# Patient Record
Sex: MALE | Race: WHITE | NOT HISPANIC OR LATINO | Employment: FULL TIME | URBAN - METROPOLITAN AREA
[De-identification: names, ages, dates, MRNs, and addresses within clinical notes are randomized per-mention and may not be internally consistent; named-entity substitution may affect disease eponyms.]

---

## 2017-06-20 ENCOUNTER — HOSPITAL ENCOUNTER (INPATIENT)
Facility: HOSPITAL | Age: 27
LOS: 8 days | Discharge: HOME | DRG: 885 | End: 2017-06-28
Attending: PSYCHIATRY & NEUROLOGY | Admitting: PSYCHIATRY & NEUROLOGY

## 2017-06-20 ENCOUNTER — HOSPITAL ENCOUNTER (EMERGENCY)
Facility: HOSPITAL | Age: 27
Discharge: PSYCHIATRIC OR SUBSTANCE USE FACILITY | End: 2017-06-20
Attending: SURGERY

## 2017-06-20 VITALS
RESPIRATION RATE: 20 BRPM | WEIGHT: 240 LBS | TEMPERATURE: 98 F | SYSTOLIC BLOOD PRESSURE: 107 MMHG | DIASTOLIC BLOOD PRESSURE: 54 MMHG | HEART RATE: 60 BPM

## 2017-06-20 DIAGNOSIS — F32.A DEPRESSION WITH SUICIDAL IDEATION: Primary | ICD-10-CM

## 2017-06-20 DIAGNOSIS — F43.10 PTSD (POST-TRAUMATIC STRESS DISORDER): ICD-10-CM

## 2017-06-20 DIAGNOSIS — F31.4 BIPOLAR DISORDER, CURRENT EPISODE DEPRESSED, SEVERE, WITHOUT PSYCHOTIC FEATURES: ICD-10-CM

## 2017-06-20 DIAGNOSIS — R45.851 DEPRESSION WITH SUICIDAL IDEATION: Primary | ICD-10-CM

## 2017-06-20 DIAGNOSIS — F41.1 GAD (GENERALIZED ANXIETY DISORDER): ICD-10-CM

## 2017-06-20 DIAGNOSIS — F17.210 CIGARETTE NICOTINE DEPENDENCE WITHOUT COMPLICATION: ICD-10-CM

## 2017-06-20 PROBLEM — F31.30 BIPOLAR AFFECTIVE DISORDER, CURRENT EPISODE DEPRESSED: Status: ACTIVE | Noted: 2017-06-20

## 2017-06-20 LAB
ALBUMIN SERPL BCP-MCNC: 4 G/DL
ALP SERPL-CCNC: 74 U/L
ALT SERPL W/O P-5'-P-CCNC: 12 U/L
AMPHET+METHAMPHET UR QL: NEGATIVE
ANION GAP SERPL CALC-SCNC: 9 MMOL/L
APAP SERPL-MCNC: <3 UG/ML
AST SERPL-CCNC: 14 U/L
BACTERIA #/AREA URNS HPF: ABNORMAL /HPF
BARBITURATES UR QL SCN>200 NG/ML: NEGATIVE
BASOPHILS # BLD AUTO: 0.01 K/UL
BASOPHILS NFR BLD: 0.1 %
BENZODIAZ UR QL SCN>200 NG/ML: NEGATIVE
BILIRUB SERPL-MCNC: 0.4 MG/DL
BILIRUB UR QL STRIP: ABNORMAL
BUN SERPL-MCNC: 10 MG/DL
BZE UR QL SCN: NEGATIVE
CALCIUM SERPL-MCNC: 8.9 MG/DL
CANNABINOIDS UR QL SCN: NEGATIVE
CHLORIDE SERPL-SCNC: 107 MMOL/L
CLARITY UR: CLEAR
CO2 SERPL-SCNC: 26 MMOL/L
COLOR UR: YELLOW
CREAT SERPL-MCNC: 1 MG/DL
CREAT UR-MCNC: 348.2 MG/DL
DIFFERENTIAL METHOD: ABNORMAL
EOSINOPHIL # BLD AUTO: 0.1 K/UL
EOSINOPHIL NFR BLD: 1.3 %
ERYTHROCYTE [DISTWIDTH] IN BLOOD BY AUTOMATED COUNT: 12.3 %
EST. GFR  (AFRICAN AMERICAN): >60 ML/MIN/1.73 M^2
EST. GFR  (NON AFRICAN AMERICAN): >60 ML/MIN/1.73 M^2
ETHANOL SERPL-MCNC: <10 MG/DL
GLUCOSE SERPL-MCNC: 98 MG/DL
GLUCOSE UR QL STRIP: NEGATIVE
HCT VFR BLD AUTO: 41.1 %
HGB BLD-MCNC: 13.8 G/DL
HGB UR QL STRIP: NEGATIVE
HYALINE CASTS #/AREA URNS LPF: 0 /LPF
KETONES UR QL STRIP: NEGATIVE
LEUKOCYTE ESTERASE UR QL STRIP: NEGATIVE
LYMPHOCYTES # BLD AUTO: 2.2 K/UL
LYMPHOCYTES NFR BLD: 31.3 %
MCH RBC QN AUTO: 31.8 PG
MCHC RBC AUTO-ENTMCNC: 33.6 %
MCV RBC AUTO: 95 FL
METHADONE UR QL SCN>300 NG/ML: NEGATIVE
MICROSCOPIC COMMENT: ABNORMAL
MONOCYTES # BLD AUTO: 0.7 K/UL
MONOCYTES NFR BLD: 10.2 %
NEUTROPHILS # BLD AUTO: 3.9 K/UL
NEUTROPHILS NFR BLD: 57.1 %
NITRITE UR QL STRIP: NEGATIVE
OPIATES UR QL SCN: NEGATIVE
PCP UR QL SCN>25 NG/ML: NEGATIVE
PH UR STRIP: 6 [PH] (ref 5–8)
PLATELET # BLD AUTO: 130 K/UL
PMV BLD AUTO: 10 FL
POTASSIUM SERPL-SCNC: 3.1 MMOL/L
PROT SERPL-MCNC: 7.1 G/DL
PROT UR QL STRIP: ABNORMAL
RBC # BLD AUTO: 4.34 M/UL
RBC #/AREA URNS HPF: 3 /HPF (ref 0–4)
SODIUM SERPL-SCNC: 142 MMOL/L
SP GR UR STRIP: >=1.03 (ref 1–1.03)
T4 FREE SERPL-MCNC: 0.95 NG/DL
TOXICOLOGY INFORMATION: NORMAL
TSH SERPL DL<=0.005 MIU/L-ACNC: 4.98 UIU/ML
URN SPEC COLLECT METH UR: ABNORMAL
UROBILINOGEN UR STRIP-ACNC: 1 EU/DL
WBC # BLD AUTO: 6.86 K/UL
WBC #/AREA URNS HPF: 2 /HPF (ref 0–5)

## 2017-06-20 PROCEDURE — 80053 COMPREHEN METABOLIC PANEL: CPT

## 2017-06-20 PROCEDURE — 99223 1ST HOSP IP/OBS HIGH 75: CPT | Mod: ,,, | Performed by: PSYCHIATRY & NEUROLOGY

## 2017-06-20 PROCEDURE — 11400000 HC PSYCH PRIVATE ROOM

## 2017-06-20 PROCEDURE — 27000339 *HC DAILY SUPPLY KIT

## 2017-06-20 PROCEDURE — 99252 IP/OBS CONSLTJ NEW/EST SF 35: CPT | Mod: ,,, | Performed by: NURSE PRACTITIONER

## 2017-06-20 PROCEDURE — 25000003 PHARM REV CODE 250: Performed by: PSYCHIATRY & NEUROLOGY

## 2017-06-20 PROCEDURE — 80320 DRUG SCREEN QUANTALCOHOLS: CPT

## 2017-06-20 PROCEDURE — 80329 ANALGESICS NON-OPIOID 1 OR 2: CPT

## 2017-06-20 PROCEDURE — 84439 ASSAY OF FREE THYROXINE: CPT

## 2017-06-20 PROCEDURE — 85025 COMPLETE CBC W/AUTO DIFF WBC: CPT

## 2017-06-20 PROCEDURE — 84443 ASSAY THYROID STIM HORMONE: CPT

## 2017-06-20 PROCEDURE — 80061 LIPID PANEL: CPT

## 2017-06-20 PROCEDURE — 90833 PSYTX W PT W E/M 30 MIN: CPT | Mod: ,,, | Performed by: PSYCHIATRY & NEUROLOGY

## 2017-06-20 PROCEDURE — 99285 EMERGENCY DEPT VISIT HI MDM: CPT

## 2017-06-20 PROCEDURE — 36415 COLL VENOUS BLD VENIPUNCTURE: CPT

## 2017-06-20 PROCEDURE — 81000 URINALYSIS NONAUTO W/SCOPE: CPT

## 2017-06-20 PROCEDURE — 80307 DRUG TEST PRSMV CHEM ANLYZR: CPT

## 2017-06-20 RX ORDER — LOPERAMIDE HYDROCHLORIDE 2 MG/1
2 CAPSULE ORAL
Status: DISCONTINUED | OUTPATIENT
Start: 2017-06-20 | End: 2017-06-28 | Stop reason: HOSPADM

## 2017-06-20 RX ORDER — IBUPROFEN 200 MG
1 TABLET ORAL DAILY PRN
Status: DISCONTINUED | OUTPATIENT
Start: 2017-06-20 | End: 2017-06-28 | Stop reason: HOSPADM

## 2017-06-20 RX ORDER — LITHIUM CARBONATE 300 MG/1
300 CAPSULE ORAL 2 TIMES DAILY
Status: DISCONTINUED | OUTPATIENT
Start: 2017-06-20 | End: 2017-06-23

## 2017-06-20 RX ORDER — DOCUSATE SODIUM 100 MG/1
100 CAPSULE, LIQUID FILLED ORAL DAILY PRN
Status: DISCONTINUED | OUTPATIENT
Start: 2017-06-20 | End: 2017-06-28 | Stop reason: HOSPADM

## 2017-06-20 RX ORDER — HYDROXYZINE PAMOATE 50 MG/1
50 CAPSULE ORAL NIGHTLY PRN
Status: DISCONTINUED | OUTPATIENT
Start: 2017-06-20 | End: 2017-06-28 | Stop reason: HOSPADM

## 2017-06-20 RX ORDER — MAG HYDROX/ALUMINUM HYD/SIMETH 200-200-20
30 SUSPENSION, ORAL (FINAL DOSE FORM) ORAL EVERY 6 HOURS PRN
Status: DISCONTINUED | OUTPATIENT
Start: 2017-06-20 | End: 2017-06-28 | Stop reason: HOSPADM

## 2017-06-20 RX ORDER — OLANZAPINE 10 MG/1
10 TABLET ORAL EVERY 4 HOURS PRN
Status: DISCONTINUED | OUTPATIENT
Start: 2017-06-20 | End: 2017-06-28 | Stop reason: HOSPADM

## 2017-06-20 RX ORDER — ACETAMINOPHEN 325 MG/1
650 TABLET ORAL EVERY 6 HOURS PRN
Status: DISCONTINUED | OUTPATIENT
Start: 2017-06-20 | End: 2017-06-28 | Stop reason: HOSPADM

## 2017-06-20 RX ORDER — HALOPERIDOL 5 MG/ML
5 INJECTION INTRAMUSCULAR
Status: DISCONTINUED | OUTPATIENT
Start: 2017-06-20 | End: 2017-06-20 | Stop reason: HOSPADM

## 2017-06-20 RX ORDER — DIPHENHYDRAMINE HYDROCHLORIDE 50 MG/ML
50 INJECTION INTRAMUSCULAR; INTRAVENOUS
Status: DISCONTINUED | OUTPATIENT
Start: 2017-06-20 | End: 2017-06-20 | Stop reason: HOSPADM

## 2017-06-20 RX ORDER — VENLAFAXINE HYDROCHLORIDE 37.5 MG/1
37.5 CAPSULE, EXTENDED RELEASE ORAL DAILY
Status: DISCONTINUED | OUTPATIENT
Start: 2017-06-20 | End: 2017-06-22

## 2017-06-20 RX ORDER — OLANZAPINE 10 MG/2ML
10 INJECTION, POWDER, FOR SOLUTION INTRAMUSCULAR EVERY 4 HOURS PRN
Status: DISCONTINUED | OUTPATIENT
Start: 2017-06-20 | End: 2017-06-28 | Stop reason: HOSPADM

## 2017-06-20 RX ORDER — LORAZEPAM 2 MG/ML
2 INJECTION INTRAMUSCULAR
Status: DISCONTINUED | OUTPATIENT
Start: 2017-06-20 | End: 2017-06-20 | Stop reason: HOSPADM

## 2017-06-20 RX ADMIN — THERA TABS 1 TABLET: TAB at 11:06

## 2017-06-20 RX ADMIN — NICOTINE 1 PATCH: 14 PATCH, EXTENDED RELEASE TRANSDERMAL at 02:06

## 2017-06-20 RX ADMIN — LITHIUM CARBONATE 300 MG: 300 CAPSULE, GELATIN COATED ORAL at 11:06

## 2017-06-20 RX ADMIN — HYDROXYZINE PAMOATE 50 MG: 50 CAPSULE ORAL at 11:06

## 2017-06-20 RX ADMIN — LITHIUM CARBONATE 300 MG: 300 CAPSULE, GELATIN COATED ORAL at 08:06

## 2017-06-20 RX ADMIN — VENLAFAXINE HYDROCHLORIDE 37.5 MG: 37.5 CAPSULE, EXTENDED RELEASE ORAL at 11:06

## 2017-06-20 NOTE — PLAN OF CARE
"  Treatment Recommendation:   1:1 Intervention (as needed)    Cognitive Stimulation Skilled Activity  Creative Expression Skilled Activity  Self Expression Skilled Activity  Mild Exercises Skilled Activity  Stress Management Skilled Activity  Coping Skilled Activity  Leisure Education and Awareness Skilled Activity    Treatment Goal(s):  Long Term Goals Refer To Master Treatment Plan    Short Term Treatment Goal(s)  Patient Will:  Exhibit Improvement in Mood  Demonstrate Constructive Expression of Feelings and Behavior  Identify at Least 2 Coping Skills or Leisure Skills to Reduce Depression and Hopelessness Upon Request from Therapist    Discharge Recommendations:  Encourage Patient to Utilize Coping Skills on a Regular Basis to Reduce the Risk of Decompensating and Re-Hospitalizations  Follow Up with After Care Appointments  Continue with Current Leisure Activities     Patient presents with sad, flat affect and "Depressed mood." Patient reports feeling hopeless and disconnected. Patient reports his admit is due to depression and suicidal ideations. Patient states "At this point I just can't take it anymore. I want the thoughts to stop." Patient reports he has been thinking of the most painless and fastest ways to kill himself. Patient reports negative leisure lifestyle of cigarettes. Patient reports he is , has no children, has his GED, employed([employer]), lives in a friend's shed in [place]. Patient verbalized main goal "I just want to be normal I guess and not have suicidal thoughts anymore."  "

## 2017-06-20 NOTE — SUBJECTIVE & OBJECTIVE
Past Medical History:   Diagnosis Date    Asthma     Depression     History of psychiatric hospitalization     Hx of psychiatric care     Psychiatric problem     Suicide attempt     Therapy        No past surgical history on file.    Review of patient's allergies indicates:  No Known Allergies    Current Facility-Administered Medications on File Prior to Encounter   Medication    [DISCONTINUED] diphenhydrAMINE injection 50 mg    [DISCONTINUED] haloperidol lactate injection 5 mg    [DISCONTINUED] lorazepam injection 2 mg     No current outpatient prescriptions on file prior to encounter.     Family History     None        Social History Main Topics    Smoking status: Current Every Day Smoker     Packs/day: 1.00     Types: Cigarettes    Smokeless tobacco: Not on file    Alcohol use Yes    Drug use: Unknown    Sexual activity: Not on file     Review of Systems   Constitutional: Negative for chills and fever.   Respiratory: Negative for chest tightness and shortness of breath.    Cardiovascular: Negative for chest pain and palpitations.   Gastrointestinal: Negative for abdominal distention, abdominal pain, blood in stool and vomiting.   Genitourinary: Negative for dysuria, flank pain, hematuria and urgency.   Musculoskeletal: Negative for back pain and neck pain.   Skin: Negative for rash and wound.   Neurological: Negative for dizziness, weakness and numbness.   Hematological: Does not bruise/bleed easily.   Psychiatric/Behavioral: Positive for self-injury. Negative for agitation and suicidal ideas. The patient is not nervous/anxious.      Objective:     Vital Signs (Most Recent):    Vital Signs (24h Range):  Temp:  [98 °F (36.7 °C)-98.2 °F (36.8 °C)] 98.2 °F (36.8 °C)  Pulse:  [60-80] 60  Resp:  [17-20] 20  BP: (107-148)/(54-73) 107/54        There is no height or weight on file to calculate BMI.    Physical Exam   Constitutional: He is oriented to person, place, and time. He appears well-developed and  well-nourished.   HENT:   Head: Normocephalic and atraumatic.   Neck: Normal range of motion. Neck supple. No thyromegaly present.   Cardiovascular: Normal rate, regular rhythm, normal heart sounds and intact distal pulses.    No murmur heard.  Pulmonary/Chest: Effort normal and breath sounds normal. No respiratory distress. He has no wheezes. He has no rales.   Abdominal: Soft. Bowel sounds are normal. He exhibits no distension. There is no tenderness.   Musculoskeletal: Normal range of motion. He exhibits no edema or deformity.   Neurological: He is alert and oriented to person, place, and time.   Neuro: Cranial nerves:  CN II Visual fields full to confrontation.   CN III, IV, VI Pupils are equal, round, and reactive to light.  CN III: no palsy  Nystagmus: none   Diplopia: none  Ophthalmoparesis: none  CN V Facial sensation intact.   CN VII Facial expression full, symmetric.   CN VIII normal.   CN IX normal.   CN X normal.   CN XI normal.   CN XII normal.     Skin: Skin is warm and dry.   Psychiatric: He has a normal mood and affect. His behavior is normal. Thought content normal.   Nursing note and vitals reviewed.      Significant Labs:   CBC:   Recent Labs  Lab 06/20/17 0316   WBC 6.86   HGB 13.8*   HCT 41.1   *     CMP:   Recent Labs  Lab 06/20/17 0316      K 3.1*      CO2 26   GLU 98   BUN 10   CREATININE 1.0   CALCIUM 8.9   PROT 7.1   ALBUMIN 4.0   BILITOT 0.4   ALKPHOS 74   AST 14   ALT 12   ANIONGAP 9   EGFRNONAA >60     TSH:   Recent Labs  Lab 06/20/17 0316   TSH 4.977*   free t4 0.95    Acetaminophen <3.00  ETOH <10    Urine Studies:   Recent Labs  Lab 06/20/17  0642   COLORU Yellow   APPEARANCEUA Clear   PHUR 6.0   SPECGRAV >=1.030*   PROTEINUA 1+*   GLUCUA Negative   KETONESU Negative   BILIRUBINUA 1+*   OCCULTUA Negative   NITRITE Negative   UROBILINOGEN 1.0   LEUKOCYTESUR Negative   RBCUA 3   WBCUA 2   BACTERIA Moderate*   HYALINECASTS 0     UDS -
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
## 2017-06-20 NOTE — PLAN OF CARE
Problem: Overarching Goals (Adult)  Goal: Develops/Participates in Therapeutic [place] to Support Successful Transition  Patient had to meet with another staff member and did not attend psychotherapy group.

## 2017-06-20 NOTE — HPI
Pt presnted to ER last night with c/o depression and suicidal ideation. He was admitted to U and medicine consulted for H/P    VSS/labs reviewed

## 2017-06-20 NOTE — CONSULTS
[facility] Hospital Medicine  Consult Note    Patient Name: Arnol Dodson  MRN: [id number]  Admission Date: 6/20/2017  Hospital Length of Stay: 0 days  Attending Physician: Denise Teresa MD   Primary Care Provider: Primary Doctor No           Patient information was obtained from patient and ER records.     Inpatient consult to [facility] for History and Physical  Consult performed by: KEZIA NORTON  Consult ordered by: DENISE TERESA        Subjective:     Principal Problem: <principal problem not specified>    Chief Complaint: No chief complaint on file.       HPI: Pt presnted to ER last night with c/o depression and suicidal ideation. He was admitted to [facility] and medicine consulted for H/P    VSS/labs reviewed     Past Medical History:   Diagnosis Date    Asthma     Depression     History of psychiatric hospitalization     Hx of psychiatric care     Psychiatric problem     Suicide attempt     Therapy        No past surgical history on file.    Review of patient's allergies indicates:  No Known Allergies    Current Facility-Administered Medications on File Prior to Encounter   Medication    [DISCONTINUED] diphenhydrAMINE injection 50 mg    [DISCONTINUED] haloperidol lactate injection 5 mg    [DISCONTINUED] lorazepam injection 2 mg     No current outpatient prescriptions on file prior to encounter.     Family History     None        Social History Main Topics    Smoking status: Current Every Day Smoker     Packs/day: 1.00     Types: Cigarettes    Smokeless tobacco: Not on file    Alcohol use Yes    Drug use: Unknown    Sexual activity: Not on file     Review of Systems   Constitutional: Negative for chills and fever.   Respiratory: Negative for chest tightness and shortness of breath.    Cardiovascular: Negative for chest pain and palpitations.   Gastrointestinal: Negative for abdominal distention, abdominal pain, blood in stool and vomiting.    Genitourinary: Negative for dysuria, flank pain, hematuria and urgency.   Musculoskeletal: Negative for back pain and neck pain.   Skin: Negative for rash and wound.   Neurological: Negative for dizziness, weakness and numbness.   Hematological: Does not bruise/bleed easily.   Psychiatric/Behavioral: Positive for self-injury. Negative for agitation and suicidal ideas. The patient is not nervous/anxious.      Objective:     Vital Signs (Most Recent):    Vital Signs (24h Range):  Temp:  [98 °F (36.7 °C)-98.2 °F (36.8 °C)] 98.2 °F (36.8 °C)  Pulse:  [60-80] 60  Resp:  [17-20] 20  BP: (107-148)/(54-73) 107/54        There is no height or weight on file to calculate BMI.    Physical Exam   Constitutional: He is oriented to person, place, and time. He appears well-developed and well-nourished.   HENT:   Head: Normocephalic and atraumatic.   Neck: Normal range of motion. Neck supple. No thyromegaly present.   Cardiovascular: Normal rate, regular rhythm, normal heart sounds and intact distal pulses.    No murmur heard.  Pulmonary/Chest: Effort normal and breath sounds normal. No respiratory distress. He has no wheezes. He has no rales.   Abdominal: Soft. Bowel sounds are normal. He exhibits no distension. There is no tenderness.   Musculoskeletal: Normal range of motion. He exhibits no edema or deformity.   Neurological: He is alert and oriented to person, place, and time.   Neuro: Cranial nerves:  CN II Visual fields full to confrontation.   CN III, IV, VI Pupils are equal, round, and reactive to light.  CN III: no palsy  Nystagmus: none   Diplopia: none  Ophthalmoparesis: none  CN V Facial sensation intact.   CN VII Facial expression full, symmetric.   CN VIII normal.   CN IX normal.   CN X normal.   CN XI normal.   CN XII normal.     Skin: Skin is warm and dry.   Psychiatric: He has a normal mood and affect. His behavior is normal. Thought content normal.   Nursing note and vitals reviewed.      Significant Labs:    CBC:   Recent Labs  Lab 06/20/17 0316   WBC 6.86   HGB 13.8*   HCT 41.1   *     CMP:   Recent Labs  Lab 06/20/17 0316      K 3.1*      CO2 26   GLU 98   BUN 10   CREATININE 1.0   CALCIUM 8.9   PROT 7.1   ALBUMIN 4.0   BILITOT 0.4   ALKPHOS 74   AST 14   ALT 12   ANIONGAP 9   EGFRNONAA >60     TSH:   Recent Labs  Lab 06/20/17 0316   TSH 4.977*   free t4 0.95    Acetaminophen <3.00  ETOH <10    Urine Studies:   Recent Labs  Lab 06/20/17 0642   COLORU Yellow   APPEARANCEUA Clear   PHUR 6.0   SPECGRAV >=1.030*   PROTEINUA 1+*   GLUCUA Negative   KETONESU Negative   BILIRUBINUA 1+*   OCCULTUA Negative   NITRITE Negative   UROBILINOGEN 1.0   LEUKOCYTESUR Negative   RBCUA 3   WBCUA 2   BACTERIA Moderate*   HYALINECASTS 0     UDS -        Assessment/Plan:     Depression with suicidal ideation    Further orders per psych            VTE Risk Mitigation     None        Thank you for your consult. I will sign off. Please contact us if you have any additional questions.    Damaris Lee NP  Department of Hospital Medicine   [facility]

## 2017-06-20 NOTE — ED PROVIDER NOTES
[facility] Emergency Room                                                    Chief Complaint  27 y.o. male with Psychiatric Evaluation    History of Present Illness  Arnol Dodson presents to the emergency room with suicidal ideation and depression  The patient states he no longer wants to live, he has no suicidal plan on interview today  Patient has severe depression, states that he has been out of medication for some time  Patient would not answer questions regarding substance abuse, is not in withdrawal now  Patient has no receive inpatient psychiatric treatment at our hospital previously per Epic  The patient is not psychotic or hallucinating, not paranoid and delusional on ER interview    The history is provided by the patient  Medical history: Asthma and depression  History reviewed. No pertinent surgical history.   No Known Allergies   History reviewed. No pertinent family history.    Review of Systems and Physical Exam     Review of Systems  -- Constitution - no fever, denies fatigue, no weakness, no chills  -- Eyes - no tearing or redness, no visual disturbance  -- Ear, Nose - no tinnitus or earache, no nasal congestion or discharge  -- Mouth,Throat - no sore throat, no toothache, normal voice, normal swallowing  -- Respiratory - denies cough and congestion, no shortness of breath, no ANNE  -- Cardiovascular - denies chest pain, no palpitations, denies claudication  -- Gastrointestinal - denies abdominal pain, nausea, vomiting, or diarrhea  -- Musculoskeletal - denies back pain, negative for myalgias and arthralgias   -- Neurological - no headache, denies weakness or seizure; no LOC  -- Psychiatric - suicidal ideation and depression, no psychosis    Vital Signs  -- His blood pressure is 148/73 and his pulse is 80. His respiration is 17.      Physical Exam  -- Nursing note and vitals reviewed  -- Constitutional: Appears well-developed and well-nourished  -- Head: Atraumatic. Normocephalic. No obvious  abnormality  -- Eyes: Pupils are equal and reactive to light. Normal conjunctiva and lids  -- Cardiac: Normal rate, regular rhythm and normal heart sounds  -- Pulmonary: Normal respiratory effort, breath sounds clear to auscultation  -- Abdominal: Soft, no tenderness. Normal bowel sounds. Normal liver edge  -- Musculoskeletal: Normal range of motion, no effusions. Joints stable   -- Neurological: No focal deficits. Showed good interaction with staff    Emergency Room Course     Diagnosis  -- The encounter diagnosis was Depression with suicidal ideation.    Disposition and Plan  -- Disposition: PEC  -- Condition: stable  -- Pt will be placed in a psychiatric facility  -- The patient is a direct observation until placement  -- The patient has been made aware of his or her rights while under PEC in the ER  -- All questions have been answered; will follow ER protocols until placement    Lab work was performed in the ER, this patient is cleared for psychiatric placement     This note is dictated on Dragon Natural Speaking word recognition program.  There are word recognition mistakes that are occasionally missed on review.           Glen Britton MD  06/20/17 0549
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
## 2017-06-21 LAB
CHOLEST/HDLC SERPL: 4.3 {RATIO}
HDL/CHOLESTEROL RATIO: 23.1 %
HDLC SERPL-MCNC: 134 MG/DL
HDLC SERPL-MCNC: 31 MG/DL
LDLC SERPL CALC-MCNC: 76.4 MG/DL
NONHDLC SERPL-MCNC: 103 MG/DL
TRIGL SERPL-MCNC: 133 MG/DL

## 2017-06-21 PROCEDURE — 11400000 HC PSYCH PRIVATE ROOM

## 2017-06-21 PROCEDURE — 36415 COLL VENOUS BLD VENIPUNCTURE: CPT

## 2017-06-21 PROCEDURE — 27000339 *HC DAILY SUPPLY KIT

## 2017-06-21 PROCEDURE — 99233 SBSQ HOSP IP/OBS HIGH 50: CPT | Mod: ,,, | Performed by: PSYCHIATRY & NEUROLOGY

## 2017-06-21 PROCEDURE — 25000003 PHARM REV CODE 250: Performed by: PSYCHIATRY & NEUROLOGY

## 2017-06-21 RX ADMIN — THERA TABS 1 TABLET: TAB at 08:06

## 2017-06-21 RX ADMIN — LITHIUM CARBONATE 300 MG: 300 CAPSULE, GELATIN COATED ORAL at 08:06

## 2017-06-21 RX ADMIN — NICOTINE 1 PATCH: 14 PATCH, EXTENDED RELEASE TRANSDERMAL at 03:06

## 2017-06-21 RX ADMIN — HYDROXYZINE PAMOATE 50 MG: 50 CAPSULE ORAL at 08:06

## 2017-06-21 RX ADMIN — VENLAFAXINE HYDROCHLORIDE 37.5 MG: 37.5 CAPSULE, EXTENDED RELEASE ORAL at 08:06

## 2017-06-21 NOTE — PLAN OF CARE
Problem: Patient Care Overview (Adult)  Goal: Plan of Care Review  Outcome: Ongoing (interventions implemented as appropriate)  Shift note : patient is in the day room . He is depressed but is taking all of his ordered medications . He is eating all meals . He continues to be very quiet .

## 2017-06-21 NOTE — PLAN OF CARE
Problem: Patient Care Overview (Adult)  Goal: Plan of Care Review  Lying quiet in bed, eyes closed, respiration even and unlabored, appearing asleep. Was asleep at the end of the past shift then awoke when PM meds were due.  Had trouble initiating sleep again.   PRN Vistaril 50mg given at 2339 with good results.  Pt slept well for remainder of shift til present.  Safety and precautions maintained with rounds every 15 minutes, bed is fixed in a low position and pathways kept clear.  No fall occurred.

## 2017-06-21 NOTE — CONSULTS
[facility]  Adult Nutrition  Consult Note    SUMMARY     Recommendations    Recommendation/Intervention: Continue Regular diet as tolerated encouraging good po intake  Goals: adquate po intake >=75%  Nutrition Goal Status: goal met  Communication of RD Recs:  (note/careplan)    Nutrition Discharge Planning: Regular diet with adequate intake to meet EEn & EPN    Continuum of Care Plan    Referral to Outpatient Services: behavioral health clinic    Reason for Assessment    Reason for Assessment: physician consult  Diagnosis: psychological disorder  Relevent Medical History: Psyc         General Information Comments: Pt admitted with depression with SI, good appetite, homeless, possibly unemployeed now    Nutrition Prescription Ordered    Current Diet Order: Regular  Nutrition Order Comments: 100% intake    Evaluation of Received Nutrients/Fluid Intake    Energy Calories Required: meeting needs  Protein Required: meeting needs  Fluid Required: meeting needs     Tolerance: tolerating     Nutrition Risk Screen     Nutrition Risk Screen: no indicators present    Nutrition/Diet History    Patient Reported Diet/Restrictions/Preferences: general     Factors Affecting Nutritional Intake: depression, socio-economic    Labs/Tests/Procedures/Meds    Pertinent Medications Reviewed: reviewed  Pertinent Medications Comments: MVI    Physical Findings    Overall Physical Appearance: overweight     Oral/Mouth Cavity: WDL  Skin: intact    Anthropometrics    Temp: 96.6 °F (35.9 °C)     Height: 6' (182.9 cm)  Weight Method: Stated  Weight: 106.6 kg (235 lb 0.2 oz)  Ideal Body Weight (IBW), Male: 178 lb     % Ideal Body Weight, Male (lb): 132.03 lb     BMI (Calculated): 31.9  BMI Grade: 30 - 34.9- obesity - grade I    Estimated/Assessed Needs    Weight Used For Calorie Calculations: 106.6 kg (235 lb 0.2 oz)   Height (cm): 182.9 cm     Energy Need Method: [facility] (NO AF BMI >25)     RMR ([facility]  Equation): 2089        Weight Used For Protein Calculations: 106.6 kg (235 lb 0.2 oz)  Protein Requirements: 85 (0.8)  0.8 gm Protein (gm): 85.46  Fluid Need Method: RDA Method (1ml/kcal or per MD)     Assessment and Plan    Nutrition Problem:   Other: No nutritional dx at this time    Etiology/Related to:   Adequate intake    As Evidenced By:   100% intake of meals at this time    Nutrition Diagnosis Status:   New    Monitor and Evaluation    Food and Nutrient Intake: food and beverage intake  Food and Nutrient Adminstration: diet order     Physical Activity and Function: nutrition-related ADLs and IADLs  Anthropometric Measurements: weight, weight change  Biochemical Data, Medical Tests and Procedures: electrolyte and renal panel  Nutrition-Focused Physical Findings: overall appearance  % Intake of Estimated Energy Needs: 75 - 100 %  % Meal Intake: 100%    Nutrition Risk    Level of Risk:  (F/U 1x/wk)    Nutrition Follow-Up    RD Follow-up?: Yes (6/26/17)

## 2017-06-21 NOTE — PLAN OF CARE
"Problem: Overarching Goals (Adult)  Goal: Develops/Participates in Therapeutic [place] to Support Successful Transition   Group Note    Behavior:  Patient attended Psychotherapy Group and participated. Patient sat away from the main group.      Intervention:  Words of [place] and Encouragement. Shared and solicited sayings with patients.  Discussed meanings of sayings and how patients could apply them to their everyday lives.     Response:  Patient had several sayings and states he uses most of those in his life. Patient noted Winifred favors the bold" is a favorite of his. Patient stated he disagreed with every cloud has a silver lining.      Plan:  Will continue to encourage patient participation in group. Will meet 1:1 with patient       "

## 2017-06-21 NOTE — PROGRESS NOTES
PSYCHIATRY DAILY INPATIENT PROGRESS NOTE  SUBSEQUENT HOSPITAL VISIT    ENCOUNTER DATE: 6/21/2017  SITE: [facility]    DATE OF ADMISSION: 6/20/2017 10:04 AM  LENGTH OF STAY: 1 days      HISTORY    CHIEF COMPLAINT   Arnol Dodson is a 27 y.o. male, seen during daily pan rounds on the inpatient unit.  Arnol Dodson presents with the chief complaint of  depression and SI    HPI   (Elements: Location, Quality, Severity, Duration, Timing, Content, Modifying Factors, Associated Signs & Symptoms)    The patient was seen and examined. The chart was reviewed.    Staff reports no behavioral or management issues.     The patient has been compliant with treatment. The patient denied any side effects.    Continued Symptoms of Depression: +diminished mood or loss of interest/anhedonia; +irritability, +diminished energy, +change in sleep, +change in appetite, +diminished concentration or cognition or indecisiveness, less PMR (no PMA), +excessive guilt or hopelessness or worthlessness, +suicidal ideations     Continued Changes in Sleep: +trouble with initiation/maintenance, no early morning awakening with inability to return to sleep     Continued Suicidal/(no)Homicidal ideations: +active/passive ideations, +organized plans (hanging self; shooting self), no future intentions     Denied Symptoms of psychosis: no hallucinations, delusions, disorganized thinking, disorganized behavior or abnormal motor behavior, or negative symptoms      Denied current Symptoms of tatum or hypomania: no elevated, expansive, or irritable mood with no  increased energy or activity; with inflated self-esteem or grandiosity, decreased need for sleep, increased rate of speech, FOI or racing thoughts, distractibility, increased goal directed activity or PMA, or risky/disinhibited behavior     Continued Symptoms of CASANDRA: +excessive anxiety/worry/fear,  with restlessness, fatigue, poor concentration, irritability, muscle tension, and sleep  disturbance     Continued Symptoms of PTSD: +h/o trauma; +re-experiencing/intrusive symptoms, +avoidant behavior, +negative alterations in cognition or mood, +hyperarousal symptoms; without dissociative symptoms          ROS  General ROS: negative  Ophthalmic ROS: negative  ENT ROS: negative  Allergy and Immunology ROS: negative  Hematological and Lymphatic ROS: negative  Endocrine ROS: negative  Respiratory ROS: no cough, shortness of breath, or wheezing  Cardiovascular ROS: no chest pain or dyspnea on exertion  Gastrointestinal ROS: no abdominal pain, change in bowel habits, or black or bloody stools  Genito-Urinary ROS: no dysuria, trouble voiding, or hematuria  Musculoskeletal ROS: negative  Neurological ROS: no TIA or stroke symptoms  Dermatological ROS: negative    PAST MEDICAL HISTORY   Past Medical History:   Diagnosis Date    Asthma     Bipolar disorder     Depression     History of psychiatric hospitalization     Hx of psychiatric care     Psychiatric problem     PTSD (post-traumatic stress disorder)     Suicide attempt     Therapy            PSYCHOTROPIC MEDICATIONS   Scheduled Meds:   lithium  300 mg Oral BID    multivitamin  1 tablet Oral Daily    venlafaxine  37.5 mg Oral Daily     Continuous Infusions:   PRN Meds:.acetaminophen, aluminum-magnesium hydroxide-simethicone, docusate sodium, hydrOXYzine pamoate, loperamide, nicotine, olanzapine **AND** olanzapine        EXAMINATION    VITALS   Vitals:    06/21/17 0800   BP: 130/79   Pulse: (!) 54   Resp: 20   Temp: 96 °F (35.6 °C)       CONSTITUTIONAL  General Appearance: WM, heavily tattooed ; NAD     MUSCULOSKELETAL  Muscle Strength and Tone:  normal  Abnormal Involuntary Movements:  none  Gait and Station:  normal; non-ataxic     PSYCHIATRIC   Level of Consciousness: awake, alert  Orientation: p/p/t/s  Grooming: diminished and inadequate to circumstances  Psychomotor Behavior: no PMA, +PMR  Speech: nl r/t/v/s  Language:  English  "fluent  Mood: "depressed"  Affect: dysphoric, blunted  Thought Process:  linear and organized  Associations:  intact; no JAYCEE  Thought Content:  denied AVH/delusions; denied HI, +SI  Memory:  intact to recent and remote events  Attention:  intact to conversation; not distractible   Fund of Knowledge:  age and education appropriate  Estimate if Intelligence:  average based on work/education history, vocabulary and mental status exam  Insight:  good- seeks help, recognizes illness  Judgment:   good- no bx issues, compliant and cooperative    DIAGNOSTIC TESTING   Laboratory Results  No results found for this or any previous visit (from the past 24 hour(s)).      ASSESSMENT      IMPRESSION   Bipolar Disorder NOS mre depressed, severe, without psychotic features  CASANDRA  PTSD  Nicotine Dependence      Elevated TSH        MEDICAL DECISION MAKING        PROBLEM LIST AND MANAGEMENT PLANS   Mood  Anxiety  Nicotine dependence  Elevated TSH    PRESCRIPTION DRUG MANAGEMENT  Compliance: yes  Side Effects: no  Regimen Adjustments:   Lithium 300 mg po BID for mood/depression  Effexor XR to 75 mg po q day for depression/anxiety     Nicotine patch daily for nicotine cessation; patient counseled     Recheck TSH Friday and will consider starting thyroid hormone for hypothyroidism and adjunctive depression     DIAGNOSTIC TESTING  Labs reviewed; follow up pending labs; check Li level on Friday with TSH     Disposition:  -SW to assist with aftercare planning and collateral  -Once stable discharge home with outpatient follow up care and/or rehab  -Continue inpatient treatment under a PEC and/or CEC for danger to self and grave disability as evident by depression with SI, diminished ADLs/self-care, and severe psycho-social stressors    NEED FOR CONTINUED HOSPITALIZATION  Psychiatric illness continues to pose a potential threat to life or bodily function, of self or others, thereby requiring the need for continued inpatient psychiatric " hospitalization: Yes    Protective inpatient pyschiatric hospitalization required while a safe disposition plan is enacted: Yes    Patient stabilized and ready for discharge from inpatient psychiatric unit: No      STAFF:   Vini Joyner MD  Psychiatry

## 2017-06-21 NOTE — PLAN OF CARE
Problem: Patient Care Overview (Adult)  Goal: Plan of Care Review  Outcome: Ongoing (interventions implemented as appropriate)  Nutrition Recommendation/Intervention: Continue Regular diet as tolerated encouraging good po intake  Goals: adquate po intake >=75%  Nutrition Goal Status: goal met  Communication of RD Recs:  (note/careplan)    Nutrition Discharge Planning: Regular diet with adequate intake to meet EEn & EPN    Continuum of Care Plan    Referral to Outpatient Services: behavioral health clinic

## 2017-06-21 NOTE — PLAN OF CARE
Problem: Patient Care Overview (Adult)  Goal: Plan of Care Review  Outcome: Ongoing (interventions implemented as appropriate)  Plan of care reviewed.  Denies intent to harm self or others at this time.  Accepts all meals and medications.  Gait steady, no falls.  Pleasant, blunted affect, interacts with staff.  States that he is feeling ok now but just hates the hospital enviroment and that is why he is sleeping so much. States feels like the activities here are childish. Allowed to use the phone after phone time to tell his ID # to the friends he lives with.  Promoted an individualized safety plan, reality-based interactions, effective coping strategies, and impulse control.  Will continue to monitor for safety.         Problem: Decreased Participation/Engagement (Adult)  Goal: Increased Participation/Engagement  Outcome: Ongoing (interventions implemented as appropriate)  Pt isolative in room most all shift.  Out only after awakened for PM meds.

## 2017-06-22 PROCEDURE — 11400000 HC PSYCH PRIVATE ROOM

## 2017-06-22 PROCEDURE — 90833 PSYTX W PT W E/M 30 MIN: CPT | Mod: ,,, | Performed by: PSYCHIATRY & NEUROLOGY

## 2017-06-22 PROCEDURE — 27000339 *HC DAILY SUPPLY KIT

## 2017-06-22 PROCEDURE — 25000003 PHARM REV CODE 250: Performed by: PSYCHIATRY & NEUROLOGY

## 2017-06-22 PROCEDURE — 99233 SBSQ HOSP IP/OBS HIGH 50: CPT | Mod: ,,, | Performed by: PSYCHIATRY & NEUROLOGY

## 2017-06-22 RX ORDER — VENLAFAXINE HYDROCHLORIDE 75 MG/1
75 CAPSULE, EXTENDED RELEASE ORAL DAILY
Status: DISCONTINUED | OUTPATIENT
Start: 2017-06-23 | End: 2017-06-24

## 2017-06-22 RX ADMIN — LITHIUM CARBONATE 300 MG: 300 CAPSULE, GELATIN COATED ORAL at 08:06

## 2017-06-22 RX ADMIN — VENLAFAXINE HYDROCHLORIDE 37.5 MG: 37.5 CAPSULE, EXTENDED RELEASE ORAL at 08:06

## 2017-06-22 RX ADMIN — HYDROXYZINE PAMOATE 50 MG: 50 CAPSULE ORAL at 10:06

## 2017-06-22 RX ADMIN — NICOTINE 1 PATCH: 14 PATCH, EXTENDED RELEASE TRANSDERMAL at 04:06

## 2017-06-22 RX ADMIN — THERA TABS 1 TABLET: TAB at 08:06
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
## 2017-06-22 NOTE — PLAN OF CARE
Problem: Patient Care Overview (Adult)  Goal: Plan of Care Review  Outcome: Ongoing (interventions implemented as appropriate)  Plan of care reviewed.  Denies intent to harm self or others at this time.  Accepts all meals and medications.  Gait steady, no falls.  Pleasant but quiet and depressed mood.  Promoted an individualized safety plan, reality-based interactions, effective coping strategies, and impulse control.  Will continue to monitor for safety.         Problem: Mood Impairment (Depressive Signs/Symptoms) (Adult)  Goal: Improved Mood Symptoms  Outcome: Ongoing (interventions implemented as appropriate)  States feeling better especially after bathing.  Still with a blunted affect and depressed mood.  States that he is willing to go to outpatient clinic for follow up.  States I think yall will send me to [facility].  Encouraged compliance after discharge and voiced understanding.

## 2017-06-22 NOTE — PLAN OF CARE
Problem: Overarching Goals (Adult)  Goal: Develops/Participates in Therapeutic [place] to Support Successful Transition  Group Note    Behavior:  Patient attended Psychotherapy Group and participated. Patient talkative but irritable.      Intervention:  Support Map - discussed the importance of supportive relationships, qualities of supportive and non supportive relationships.Patients completed a support mad, listing different types of supports, recognizing different levels of support. Patients completed safety plans.       Response:  Patient listed his Friend an friend's mother and an aunt  as supports.  Patient on listed friend on his safety plan. Patient encouraged to expand network of support and cultivate and improve relationships as well as recognize his role in both good and bad relationships.     Plan:  Will continue to encourage patient participation in group.

## 2017-06-22 NOTE — PROGRESS NOTES
PSYCHIATRY DAILY INPATIENT PROGRESS NOTE  SUBSEQUENT HOSPITAL VISIT    ENCOUNTER DATE: 6/22/2017  [facility]    DATE OF ADMISSION: 6/20/2017 10:04 AM  LENGTH OF STAY: 2 days      HISTORY    CHIEF COMPLAINT   Arnol Dodson is a 27 y.o. male, seen during daily pan rounds on the inpatient unit.  Arnol Dodson presents with the chief complaint of  depression and SI    HPI   (Elements: Location, Quality, Severity, Duration, Timing, Content, Modifying Factors, Associated Signs & Symptoms)    The patient was seen and examined. The chart was reviewed.    Staff reports no behavioral or management issues.     The patient has been compliant with treatment. The patient denied any side effects.    He reports no change in his symptoms as documented below. He participates in some groups minimally. He mostly isolates.     Continued Symptoms of Depression: +diminished mood or loss of interest/anhedonia; +irritability, +diminished energy, +change in sleep, +change in appetite, +diminished concentration or cognition or indecisiveness, less PMR (no PMA), +excessive guilt or hopelessness or worthlessness, +suicidal ideations     Continued Changes in Sleep: +trouble with initiation/maintenance, no early morning awakening with inability to return to sleep     Continued Suicidal/(no)Homicidal ideations: +active/passive ideations, +organized plans (hanging self; shooting self), no future intentions     Denied Symptoms of psychosis: no hallucinations, delusions, disorganized thinking, disorganized behavior or abnormal motor behavior, or negative symptoms      Denied current Symptoms of tatum or hypomania: no elevated, expansive, or irritable mood with no  increased energy or activity; with inflated self-esteem or grandiosity, decreased need for sleep, increased rate of speech, FOI or racing thoughts, distractibility, increased goal directed activity or PMA, or risky/disinhibited behavior     Continued Symptoms of CASANDRA:  +excessive anxiety/worry/fear,  with restlessness, fatigue, poor concentration, irritability, muscle tension, and sleep disturbance     Continued Symptoms of PTSD: +h/o trauma; +re-experiencing/intrusive symptoms, +avoidant behavior, +negative alterations in cognition or mood, +hyperarousal symptoms; without dissociative symptoms      PSYCHOTHERAPY ADD-ON +[id number]   30 (16-37*) minutes      Time: 17 minutes  Participants: Met with patient    Therapeutic Intervention Type: insight oriented psychotherapy, behavior modifying psychotherapy, supportive psychotherapy  Why chosen therapy is appropriate versus another modality: relevant to diagnosis, patient responds to this modality, evidence based practice    Target symptoms: depression, anxiety   Primary focus: coping skills  Psychotherapeutic techniques: supportive and behavioral techniques; healthy distraction, recreational therapy technqiues    Outcome monitoring methods: self-report, observation    Patient's response to intervention:  The patient's response to intervention is accepting.    Progress toward goals:  The patient's progress toward goals is fair .            ROS  General ROS: negative  Ophthalmic ROS: negative  ENT ROS: negative  Allergy and Immunology ROS: negative  Hematological and Lymphatic ROS: negative  Endocrine ROS: negative  Respiratory ROS: no cough, shortness of breath, or wheezing  Cardiovascular ROS: no chest pain or dyspnea on exertion  Gastrointestinal ROS: no abdominal pain, change in bowel habits, or black or bloody stools  Genito-Urinary ROS: no dysuria, trouble voiding, or hematuria  Musculoskeletal ROS: negative  Neurological ROS: no TIA or stroke symptoms  Dermatological ROS: negative    PAST MEDICAL HISTORY   Past Medical History:   Diagnosis Date    Asthma     Bipolar disorder     Depression     History of psychiatric hospitalization     Hx of psychiatric care     Psychiatric problem     PTSD (post-traumatic stress disorder)   "   Suicide attempt     Therapy            PSYCHOTROPIC MEDICATIONS   Scheduled Meds:   lithium  300 mg Oral BID    multivitamin  1 tablet Oral Daily    venlafaxine  37.5 mg Oral Daily     Continuous Infusions:   PRN Meds:.acetaminophen, aluminum-magnesium hydroxide-simethicone, docusate sodium, hydrOXYzine pamoate, loperamide, nicotine, olanzapine **AND** olanzapine        EXAMINATION    VITALS   Vitals:    06/22/17 0800   BP: 121/86   Pulse: 71   Resp: 18   Temp: 96.2 °F (35.7 °C)       CONSTITUTIONAL  General Appearance: WM, heavily tattooed ; NAD     MUSCULOSKELETAL  Muscle Strength and Tone:  normal  Abnormal Involuntary Movements:  none  Gait and Station:  normal; non-ataxic     PSYCHIATRIC   Level of Consciousness: awake, alert  Orientation: p/p/t/s  Grooming: diminished and inadequate to circumstances  Psychomotor Behavior: no PMA, +PMR  Speech: nl r/t/v/s  Language:  English fluent  Mood: "depressed"  Affect: dysphoric, blunted  Thought Process:  linear and organized  Associations:  intact; no JAYCEE  Thought Content:  denied AVH/delusions; denied HI, +SI  Memory:  intact to recent and remote events  Attention:  intact to conversation; not distractible   Fund of Knowledge:  age and education appropriate  Estimate if Intelligence:  average based on work/education history, vocabulary and mental status exam  Insight:  good- seeks help, recognizes illness  Judgment:   good- no bx issues, compliant and cooperative    DIAGNOSTIC TESTING   Laboratory Results  No results found for this or any previous visit (from the past 24 hour(s)).      ASSESSMENT      IMPRESSION   Bipolar Disorder NOS mre depressed, severe, without psychotic features  CASANDRA  PTSD  Nicotine Dependence      Elevated TSH        MEDICAL DECISION MAKING        PROBLEM LIST AND MANAGEMENT PLANS   Mood  Anxiety  Nicotine dependence  Elevated TSH    PRESCRIPTION DRUG MANAGEMENT  Compliance: yes  Side Effects: no  Regimen Adjustments:   Lithium 300 mg po " BID for mood/depression  Effexor XR increased to 75 mg po q day for depression/anxiety     Nicotine patch daily for nicotine cessation; patient counseled     Recheck TSH Friday and will consider starting thyroid hormone for hypothyroidism and adjunctive depression     DIAGNOSTIC TESTING  Labs reviewed; follow up pending labs; check Li level on Friday with TSH     Disposition:  -SW to assist with aftercare planning and collateral  -Once stable discharge home with outpatient follow up care and/or rehab  -Continue inpatient treatment under a PEC and/or CEC for danger to self and grave disability as evident by depression with SI, diminished ADLs/self-care, and severe psycho-social stressors    NEED FOR CONTINUED HOSPITALIZATION  Psychiatric illness continues to pose a potential threat to life or bodily function, of self or others, thereby requiring the need for continued inpatient psychiatric hospitalization: Yes    Protective inpatient pyschiatric hospitalization required while a safe disposition plan is enacted: Yes    Patient stabilized and ready for discharge from inpatient psychiatric unit: No      STAFF:   Vini Joyner MD  Psychiatry

## 2017-06-23 LAB
LITHIUM SERPL-SCNC: 0.4 MMOL/L
TSH SERPL DL<=0.005 MIU/L-ACNC: 3.7 UIU/ML

## 2017-06-23 PROCEDURE — 99233 SBSQ HOSP IP/OBS HIGH 50: CPT | Mod: ,,, | Performed by: PSYCHIATRY & NEUROLOGY

## 2017-06-23 PROCEDURE — 80178 ASSAY OF LITHIUM: CPT

## 2017-06-23 PROCEDURE — 27000339 *HC DAILY SUPPLY KIT

## 2017-06-23 PROCEDURE — 84443 ASSAY THYROID STIM HORMONE: CPT

## 2017-06-23 PROCEDURE — 25000003 PHARM REV CODE 250: Performed by: PSYCHIATRY & NEUROLOGY

## 2017-06-23 PROCEDURE — 11400000 HC PSYCH PRIVATE ROOM

## 2017-06-23 PROCEDURE — 36415 COLL VENOUS BLD VENIPUNCTURE: CPT

## 2017-06-23 RX ORDER — LITHIUM CARBONATE 300 MG/1
600 CAPSULE ORAL 2 TIMES DAILY
Status: DISCONTINUED | OUTPATIENT
Start: 2017-06-23 | End: 2017-06-28 | Stop reason: HOSPADM

## 2017-06-23 RX ORDER — LEVOTHYROXINE SODIUM 25 UG/1
25 TABLET ORAL
Status: DISCONTINUED | OUTPATIENT
Start: 2017-06-24 | End: 2017-06-27

## 2017-06-23 RX ADMIN — LITHIUM CARBONATE 600 MG: 300 CAPSULE, GELATIN COATED ORAL at 08:06

## 2017-06-23 RX ADMIN — HYDROXYZINE PAMOATE 50 MG: 50 CAPSULE ORAL at 08:06

## 2017-06-23 RX ADMIN — NICOTINE 1 PATCH: 14 PATCH, EXTENDED RELEASE TRANSDERMAL at 04:06

## 2017-06-23 RX ADMIN — VENLAFAXINE HYDROCHLORIDE 75 MG: 75 CAPSULE, EXTENDED RELEASE ORAL at 08:06

## 2017-06-23 RX ADMIN — THERA TABS 1 TABLET: TAB at 08:06

## 2017-06-23 RX ADMIN — LITHIUM CARBONATE 300 MG: 300 CAPSULE, GELATIN COATED ORAL at 08:06

## 2017-06-23 NOTE — PROGRESS NOTES
PSYCHIATRY DAILY INPATIENT PROGRESS NOTE  SUBSEQUENT HOSPITAL VISIT    ENCOUNTER DATE: 6/23/2017  [facility]    DATE OF ADMISSION: 6/20/2017 10:04 AM  LENGTH OF STAY: 3 days      HISTORY    CHIEF COMPLAINT   Arnol Dodson is a 27 y.o. male, seen during daily pan rounds on the inpatient unit.  Arnol Dodson presents with the chief complaint of  depression and SI    HPI   (Elements: Location, Quality, Severity, Duration, Timing, Content, Modifying Factors, Associated Signs & Symptoms)    The patient was seen and examined. The chart was reviewed.    Staff reports no behavioral or management issues.     The patient has been compliant with treatment. The patient denied any side effects.    He reports mild changes in his symptoms as documented below. He participates in some groups minimally. He mostly isolates to his room.     Continued Symptoms of Depression: +diminished mood or loss of interest/anhedonia; +irritability, +diminished energy, less change in sleep, less change in appetite, +diminished concentration or cognition or indecisiveness, less PMR (no PMA), +excessive guilt or hopelessness or worthlessness, less suicidal ideations     Continued but less Changes in Sleep: less trouble with initiation/maintenance, no early morning awakening with inability to return to sleep     Continued but less Suicidal/(no)Homicidal ideations: less active/passive ideations, less organized plans, no future intentions     Denied Symptoms of psychosis: no hallucinations, delusions, disorganized thinking, disorganized behavior or abnormal motor behavior, or negative symptoms      Denied current Symptoms of tatum or hypomania: no elevated, expansive, or irritable mood with no  increased energy or activity; with inflated self-esteem or grandiosity, decreased need for sleep, increased rate of speech, FOI or racing thoughts, distractibility, increased goal directed activity or PMA, or risky/disinhibited  behavior     Continued Symptoms of CASANDRA: +excessive anxiety/worry/fear,  with restlessness, fatigue, poor concentration, irritability, muscle tension, and sleep disturbance     Continued Symptoms of PTSD: +h/o trauma; +re-experiencing/intrusive symptoms, +avoidant behavior, +negative alterations in cognition or mood, +hyperarousal symptoms; without dissociative symptoms          ROS  General ROS: negative  Ophthalmic ROS: negative  ENT ROS: negative  Allergy and Immunology ROS: negative  Hematological and Lymphatic ROS: negative  Endocrine ROS: negative  Respiratory ROS: no cough, shortness of breath, or wheezing  Cardiovascular ROS: no chest pain or dyspnea on exertion  Gastrointestinal ROS: no abdominal pain, change in bowel habits, or black or bloody stools  Genito-Urinary ROS: no dysuria, trouble voiding, or hematuria  Musculoskeletal ROS: negative  Neurological ROS: no TIA or stroke symptoms  Dermatological ROS: negative      PAST MEDICAL HISTORY   Past Medical History:   Diagnosis Date    Asthma     Bipolar disorder     Depression     History of psychiatric hospitalization     Hx of psychiatric care     Psychiatric problem     PTSD (post-traumatic stress disorder)     Suicide attempt     Therapy            PSYCHOTROPIC MEDICATIONS   Scheduled Meds:   lithium  300 mg Oral BID    multivitamin  1 tablet Oral Daily    venlafaxine  75 mg Oral Daily     Continuous Infusions:   PRN Meds:.acetaminophen, aluminum-magnesium hydroxide-simethicone, docusate sodium, hydrOXYzine pamoate, loperamide, nicotine, olanzapine **AND** olanzapine        EXAMINATION    VITALS   Vitals:    06/23/17 0800   BP: 121/75   Pulse: 92   Resp: 18   Temp: 96.9 °F (36.1 °C)       CONSTITUTIONAL  General Appearance: WM, heavily tattooed ; NAD     MUSCULOSKELETAL  Muscle Strength and Tone:  normal  Abnormal Involuntary Movements:  none  Gait and Station:  normal; non-ataxic     PSYCHIATRIC   Level of Consciousness: awake,  "alert  Orientation: p/p/t/s  Grooming: diminished and inadequate to circumstances  Psychomotor Behavior: no PMA, +PMR  Speech: nl r/t/v/s  Language:  English fluent  Mood: "depressed"  Affect: dysphoric, blunted  Thought Process:  linear and organized  Associations:  intact; no JAYCEE  Thought Content:  denied AVH/delusions; denied HI, less SI  Memory:  intact to recent and remote events  Attention:  intact to conversation; not distractible   Fund of Knowledge:  age and education appropriate  Estimate if Intelligence:  average based on work/education history, vocabulary and mental status exam  Insight:  good- seeks help, recognizes illness  Judgment:   good- no bx issues, compliant and cooperative    DIAGNOSTIC TESTING   Laboratory Results  Recent Results (from the past 24 hour(s))   Lithium level    Collection Time: 06/23/17  6:11 AM   Result Value Ref Range    Lithium Lvl 0.4 (L) 0.6 - 1.2 mmol/L         ASSESSMENT      IMPRESSION   Bipolar Disorder NOS mre depressed, severe, without psychotic features  CASANDRA  PTSD  Nicotine Dependence      Elevated TSH        MEDICAL DECISION MAKING        PROBLEM LIST AND MANAGEMENT PLANS   Mood  Anxiety  Nicotine dependence  Elevated TSH- resolved    PRESCRIPTION DRUG MANAGEMENT  Compliance: yes  Side Effects: no  Regimen Adjustments:   Lithium to 600 mg po BID for mood/depression  Effexor XR 75 mg po q day for depression/anxiety  Synthroid 25 mcg po q day for adjunctive depression (off-label)     Nicotine patch daily for nicotine cessation; patient counseled          DIAGNOSTIC TESTING  Labs reviewed; follow up pending labs; check Li level on Tuesday with TSH     Disposition:  -SW to assist with aftercare planning and collateral  -Once stable discharge home with outpatient follow up care and/or rehab  -Continue inpatient treatment under a PEC and/or CEC for danger to self and grave disability as evident by depression with SI, diminished ADLs/self-care, and severe psycho-social " stressors    NEED FOR CONTINUED HOSPITALIZATION  Psychiatric illness continues to pose a potential threat to life or bodily function, of self or others, thereby requiring the need for continued inpatient psychiatric hospitalization: Yes    Protective inpatient pyschiatric hospitalization required while a safe disposition plan is enacted: Yes    Patient stabilized and ready for discharge from inpatient psychiatric unit: No      STAFF:   Vini Joyner MD  Psychiatry

## 2017-06-23 NOTE — PLAN OF CARE
Problem: Patient Care Overview (Adult)  Goal: Plan of Care Review  Up out of room on unit. Some interaction noted with peers and staff. Expressing needs and concerns to staff. Full affect. Alert and oriented . Denies suicidal ideation at this time. NO evidence of self harming behavior noted. Mood and behavior improved and appropriate. Accepting meals and meds.

## 2017-06-24 PROCEDURE — 11400000 HC PSYCH PRIVATE ROOM

## 2017-06-24 PROCEDURE — 99233 SBSQ HOSP IP/OBS HIGH 50: CPT | Mod: ,,, | Performed by: PSYCHIATRY & NEUROLOGY

## 2017-06-24 PROCEDURE — 27000339 *HC DAILY SUPPLY KIT

## 2017-06-24 PROCEDURE — 90833 PSYTX W PT W E/M 30 MIN: CPT | Mod: ,,, | Performed by: PSYCHIATRY & NEUROLOGY

## 2017-06-24 PROCEDURE — 25000003 PHARM REV CODE 250: Performed by: PSYCHIATRY & NEUROLOGY

## 2017-06-24 RX ADMIN — NICOTINE 1 PATCH: 14 PATCH, EXTENDED RELEASE TRANSDERMAL at 04:06

## 2017-06-24 RX ADMIN — LITHIUM CARBONATE 600 MG: 300 CAPSULE, GELATIN COATED ORAL at 08:06

## 2017-06-24 RX ADMIN — VENLAFAXINE HYDROCHLORIDE 75 MG: 75 CAPSULE, EXTENDED RELEASE ORAL at 09:06

## 2017-06-24 RX ADMIN — LEVOTHYROXINE SODIUM 25 MCG: 25 TABLET ORAL at 05:06

## 2017-06-24 RX ADMIN — HYDROXYZINE PAMOATE 50 MG: 50 CAPSULE ORAL at 08:06

## 2017-06-24 RX ADMIN — THERA TABS 1 TABLET: TAB at 09:06

## 2017-06-24 NOTE — PLAN OF CARE
Problem: Patient Care Overview (Adult)  Goal: Plan of Care Review  Outcome: Ongoing (interventions implemented as appropriate)  Shift note : patient is in the day room with staff and peers he is talking with them . He is eating all of his meals and taking all of his ordered medications . He returned to bed after he ate . Casually groomed

## 2017-06-24 NOTE — PLAN OF CARE
Problem: Patient Care Overview (Adult)  Goal: Plan of Care Review  Outcome: Ongoing (interventions implemented as appropriate)  Visible in the milieu.  Dressed in personal clothes.  Spending time in the dayroom watching TV.  Some interaction with peers and staff.  Quiet, calm and cooperative.  Eating well.  Tolerating/compliant with meds.  Denies S/I/HI.  Safety checks ongoing.

## 2017-06-24 NOTE — PROGRESS NOTES
PSYCHIATRY DAILY INPATIENT PROGRESS NOTE  SUBSEQUENT HOSPITAL VISIT    ENCOUNTER DATE: 6/24/2017  SITE: [facility]    DATE OF ADMISSION: 6/20/2017 10:04 AM  LENGTH OF STAY: 4 days      HISTORY    CHIEF COMPLAINT   Arnol Dodson is a 27 y.o. male, seen during daily pan rounds on the inpatient unit.  Arnol Dodson presents with the chief complaint of  depression and SI    HPI   (Elements: Location, Quality, Severity, Duration, Timing, Content, Modifying Factors, Associated Signs & Symptoms)    The patient was seen and examined. The chart was reviewed.    Staff reports no behavioral or management issues.     The patient has been compliant with treatment. The patient denied any side effects.    He reports mild changes in his symptoms as documented below. He participates in some groups minimally. He mostly isolates to his room. He is reading a book and finding it enjoyable    Continued Symptoms of Depression: +diminished mood or loss of interest/anhedonia; +irritability, +diminished energy, less change in sleep, less change in appetite, no diminished concentration or cognition or indecisiveness, less PMR (no PMA), +excessive guilt or hopelessness or worthlessness, less suicidal ideations; symptoms are minimally changed since admit     Continued but less Changes in Sleep: less trouble with initiation/maintenance, no early morning awakening with inability to return to sleep; slept 7 hours last night     Continued but less Suicidal/(no)Homicidal ideations: less active/passive ideations, less organized plans, no future intentions     Denied Symptoms of psychosis: no hallucinations, delusions, disorganized thinking, disorganized behavior or abnormal motor behavior, or negative symptoms      Denied current Symptoms of tatum or hypomania: no elevated, expansive, or irritable mood with no  increased energy or activity; with inflated self-esteem or grandiosity, decreased need for sleep, increased rate of speech, FOI or  racing thoughts, distractibility, increased goal directed activity or PMA, or risky/disinhibited behavior     Continued but less Symptoms of CASANDRA: +excessive anxiety/worry/fear,  with restlessness, fatigue, poor concentration, irritability, muscle tension, and sleep disturbance     Continued but less Symptoms of PTSD: +h/o trauma; +re-experiencing/intrusive symptoms, +avoidant behavior, +negative alterations in cognition or mood, +hyperarousal symptoms; without dissociative symptoms      PSYCHOTHERAPY ADD-ON +[id number]   30 (16-37*) minutes      Time: 16 minutes  Participants: Met with patient    Therapeutic Intervention Type: insight oriented psychotherapy, behavior modifying psychotherapy, supportive psychotherapy  Why chosen therapy is appropriate versus another modality: relevant to diagnosis, patient responds to this modality, evidence based practice    Target symptoms: depression  Primary focus: mood  Psychotherapeutic techniques: supportive techniques; psycho-education    Outcome monitoring methods: self-report, observation    Patient's response to intervention:  The patient's response to intervention is accepting.    Progress toward goals:  The patient's progress toward goals is fair .          ROS  General ROS: negative  Ophthalmic ROS: negative  ENT ROS: negative  Allergy and Immunology ROS: negative  Hematological and Lymphatic ROS: negative  Endocrine ROS: negative  Respiratory ROS: no cough, shortness of breath, or wheezing  Cardiovascular ROS: no chest pain or dyspnea on exertion  Gastrointestinal ROS: no abdominal pain, change in bowel habits, or black or bloody stools  Genito-Urinary ROS: no dysuria, trouble voiding, or hematuria  Musculoskeletal ROS: negative  Neurological ROS: no TIA or stroke symptoms  Dermatological ROS: negative      PAST MEDICAL HISTORY   Past Medical History:   Diagnosis Date    Asthma     Bipolar disorder     Depression     History of psychiatric hospitalization     Hx of  "psychiatric care     Psychiatric problem     PTSD (post-traumatic stress disorder)     Suicide attempt     Therapy            PSYCHOTROPIC MEDICATIONS   Scheduled Meds:   levothyroxine  25 mcg Oral Before breakfast    lithium  600 mg Oral BID    multivitamin  1 tablet Oral Daily    venlafaxine  75 mg Oral Daily     Continuous Infusions:   PRN Meds:.acetaminophen, aluminum-magnesium hydroxide-simethicone, docusate sodium, hydrOXYzine pamoate, loperamide, nicotine, olanzapine **AND** olanzapine        EXAMINATION    VITALS   Vitals:    06/24/17 0800   BP: 124/81   Pulse: 67   Resp: 16   Temp: 96.1 °F (35.6 °C)       CONSTITUTIONAL  General Appearance: WM, heavily tattooed ; NAD     MUSCULOSKELETAL  Muscle Strength and Tone:  normal  Abnormal Involuntary Movements:  none  Gait and Station:  normal; non-ataxic     PSYCHIATRIC   Level of Consciousness: awake, alert  Orientation: p/p/t/s  Grooming: less diminished and inadequate to circumstances; improving  Psychomotor Behavior: no PMA, less PMR  Speech: nl r/t/v/s  Language:  English fluent  Mood: "the same"  Affect: dysphoric, blunted  Thought Process:  linear and organized  Associations:  intact; no JAYCEE  Thought Content:  denied AVH/delusions; denied HI, less SI  Memory:  intact to recent and remote events  Attention:  intact to conversation; not distractible   Fund of Knowledge:  age and education appropriate  Estimate if Intelligence:  average based on work/education history, vocabulary and mental status exam  Insight:  good- seeks help, recognizes illness  Judgment:   good- no bx issues, compliant and cooperative    DIAGNOSTIC TESTING   Laboratory Results  No results found for this or any previous visit (from the past 24 hour(s)).      ASSESSMENT      IMPRESSION   Bipolar Disorder NOS mre depressed, severe, without psychotic features  CASANDRA  PTSD  Nicotine Dependence      Elevated TSH        MEDICAL DECISION MAKING        PROBLEM LIST AND MANAGEMENT PLANS "   Mood  Anxiety  Nicotine dependence  Elevated TSH- resolved    PRESCRIPTION DRUG MANAGEMENT  Compliance: yes  Side Effects: no  Regimen Adjustments:   Lithium increased to 600 mg po BID for mood/depression  Effexor XR to 112.5 mg po q day for depression/anxiety  Synthroid 25 mcg po q day for adjunctive depression (off-label)     Nicotine patch daily for nicotine cessation; patient counseled          DIAGNOSTIC TESTING  Labs reviewed; follow up pending labs; check Li level on Tuesday with TSH     Disposition:  -SW to assist with aftercare planning and collateral  -Once stable discharge home with outpatient follow up care and/or rehab  -Continue inpatient treatment under a PEC and/or CEC for danger to self and grave disability as evident by depression with SI, diminished ADLs/self-care, and severe psycho-social stressors    NEED FOR CONTINUED HOSPITALIZATION  Psychiatric illness continues to pose a potential threat to life or bodily function, of self or others, thereby requiring the need for continued inpatient psychiatric hospitalization: Yes    Protective inpatient pyschiatric hospitalization required while a safe disposition plan is enacted: Yes    Patient stabilized and ready for discharge from inpatient psychiatric unit: No      STAFF:   Vini Joyner MD  Psychiatry

## 2017-06-24 NOTE — PLAN OF CARE
Problem: Patient Care Overview (Adult)  Goal: Plan of Care Review  Outcome: Ongoing (interventions implemented as appropriate)  Pt is sleeping at this time and has slept 6.5 hour so far with 2 interruptions.  NAD.  Resp even & unlabored.  Pathways clear and bed is low.  Q 15 minute safety checks ongoing.  All precautions maintained.

## 2017-06-25 PROCEDURE — 11400000 HC PSYCH PRIVATE ROOM

## 2017-06-25 PROCEDURE — 27000339 *HC DAILY SUPPLY KIT

## 2017-06-25 PROCEDURE — 99233 SBSQ HOSP IP/OBS HIGH 50: CPT | Mod: ,,, | Performed by: PSYCHIATRY & NEUROLOGY

## 2017-06-25 PROCEDURE — 25000003 PHARM REV CODE 250: Performed by: PSYCHIATRY & NEUROLOGY

## 2017-06-25 RX ORDER — HYDROXYZINE PAMOATE 50 MG/1
100 CAPSULE ORAL NIGHTLY
Status: DISCONTINUED | OUTPATIENT
Start: 2017-06-25 | End: 2017-06-28 | Stop reason: HOSPADM

## 2017-06-25 RX ADMIN — HYDROXYZINE PAMOATE 100 MG: 50 CAPSULE ORAL at 08:06

## 2017-06-25 RX ADMIN — LEVOTHYROXINE SODIUM 25 MCG: 25 TABLET ORAL at 05:06

## 2017-06-25 RX ADMIN — VENLAFAXINE HYDROCHLORIDE 112.5 MG: 75 CAPSULE, EXTENDED RELEASE ORAL at 08:06

## 2017-06-25 RX ADMIN — LITHIUM CARBONATE 600 MG: 300 CAPSULE, GELATIN COATED ORAL at 08:06

## 2017-06-25 RX ADMIN — NICOTINE 1 PATCH: 14 PATCH, EXTENDED RELEASE TRANSDERMAL at 04:06

## 2017-06-25 RX ADMIN — THERA TABS 1 TABLET: TAB at 08:06

## 2017-06-25 NOTE — PLAN OF CARE
Problem: Patient Care Overview (Adult)  Goal: Plan of Care Review  Outcome: Ongoing (interventions implemented as appropriate)  No falls noted, accepting meals and meds, out on unit in dayroom, mood improving, denies suicidal or homicidal ideation, continuing to monitor behavior

## 2017-06-25 NOTE — PROGRESS NOTES
PSYCHIATRY DAILY INPATIENT PROGRESS NOTE  SUBSEQUENT HOSPITAL VISIT    ENCOUNTER DATE: 6/25/2017  SITE: [facility] [place]    DATE OF ADMISSION: 6/20/2017 10:04 AM  LENGTH OF STAY: 5 days      HISTORY    CHIEF COMPLAINT   Arnol Dodson is a 27 y.o. male, seen during daily pan rounds on the inpatient unit.  Arnol Dodson presents with the chief complaint of  depression and SI    HPI   (Elements: Location, Quality, Severity, Duration, Timing, Content, Modifying Factors, Associated Signs & Symptoms)    The patient was seen and examined. The chart was reviewed.    Staff reports no behavioral or management issues.     The patient has been compliant with treatment. The patient denied any side effects.    He again reports mild changes in his symptoms as documented below. He participates in some groups minimally. He mostly isolates to his room. He is reading a book and finding it enjoyable, but otherwise is not performing any activities.     Continued Symptoms of Depression: +diminished mood or loss of interest/anhedonia; +irritability, +diminished energy, less change in sleep, less change in appetite, no diminished concentration or cognition or indecisiveness, less PMR (no PMA), +excessive guilt or hopelessness or worthlessness, less suicidal ideations; symptoms are mildly changed since admit     Continued but less Changes in Sleep: less trouble with initiation/maintenance, no early morning awakening with inability to return to sleep; slept 7 hours last night- more broken; some efficacy with vistaril- patient requests an increased dosage     Continued but less Suicidal/(no)Homicidal ideations: less active/passive ideations, less organized plans, no future intentions; symptoms persist but are less frequent/intense     Denied Symptoms of psychosis: no hallucinations, delusions, disorganized thinking, disorganized behavior or abnormal motor behavior, or negative symptoms      Denied current Symptoms of tatum or hypomania:  no elevated, expansive, or irritable mood with no  increased energy or activity; with inflated self-esteem or grandiosity, decreased need for sleep, increased rate of speech, FOI or racing thoughts, distractibility, increased goal directed activity or PMA, or risky/disinhibited behavior     Continued but less Symptoms of CASANDRA: +excessive anxiety/worry/fear,  with restlessness, fatigue, poor concentration, irritability, muscle tension, and sleep disturbance     Continued but less Symptoms of PTSD: +h/o trauma; +re-experiencing/intrusive symptoms, +avoidant behavior, +negative alterations in cognition or mood, +hyperarousal symptoms; without dissociative symptoms            ROS  General ROS: negative  Ophthalmic ROS: negative  ENT ROS: negative  Allergy and Immunology ROS: negative  Hematological and Lymphatic ROS: negative  Endocrine ROS: negative  Respiratory ROS: no cough, shortness of breath, or wheezing  Cardiovascular ROS: no chest pain or dyspnea on exertion  Gastrointestinal ROS: no abdominal pain, change in bowel habits, or black or bloody stools  Genito-Urinary ROS: no dysuria, trouble voiding, or hematuria  Musculoskeletal ROS: negative  Neurological ROS: no TIA or stroke symptoms  Dermatological ROS: negative      PAST MEDICAL HISTORY   Past Medical History:   Diagnosis Date    Asthma     Bipolar disorder     Depression     History of psychiatric hospitalization     Hx of psychiatric care     Psychiatric problem     PTSD (post-traumatic stress disorder)     Suicide attempt     Therapy            PSYCHOTROPIC MEDICATIONS   Scheduled Meds:   levothyroxine  25 mcg Oral Before breakfast    lithium  600 mg Oral BID    multivitamin  1 tablet Oral Daily    venlafaxine  112.5 mg Oral Daily     Continuous Infusions:   PRN Meds:.acetaminophen, aluminum-magnesium hydroxide-simethicone, docusate sodium, hydrOXYzine pamoate, loperamide, nicotine, olanzapine **AND** olanzapine        EXAMINATION    VITALS  "  Vitals:    06/24/17 2008   BP: 129/78   Pulse: 72   Resp: 18   Temp: 96.5 °F (35.8 °C)       CONSTITUTIONAL  General Appearance: WM, heavily tattooed ; NAD     MUSCULOSKELETAL  Muscle Strength and Tone:  normal  Abnormal Involuntary Movements:  none  Gait and Station:  normal; non-ataxic     PSYCHIATRIC   Level of Consciousness: awake, alert  Orientation: p/p/t/s  Grooming: less diminished and inadequate to circumstances; improving  Psychomotor Behavior: no PMA, less PMR  Speech: nl r/t/v/s  Language:  English fluent  Mood: "no change"  Affect: dysphoric, blunted  Thought Process:  linear and organized  Associations:  intact; no JAYCEE  Thought Content:  denied AVH/delusions; denied HI, less SI  Memory:  intact to recent and remote events  Attention:  intact to conversation; not distractible   Fund of Knowledge:  age and education appropriate  Estimate if Intelligence:  average based on work/education history, vocabulary and mental status exam  Insight:  good- seeks help, recognizes illness  Judgment:   good- no bx issues, compliant and cooperative    DIAGNOSTIC TESTING   Laboratory Results  No results found for this or any previous visit (from the past 24 hour(s)).      ASSESSMENT      IMPRESSION   Bipolar Disorder NOS mre depressed, severe, without psychotic features  CASANDRA  PTSD  Nicotine Dependence      Elevated TSH        MEDICAL DECISION MAKING        PROBLEM LIST AND MANAGEMENT PLANS   Mood  Anxiety  insomnia  Nicotine dependence  Elevated TSH- resolved    PRESCRIPTION DRUG MANAGEMENT  Compliance: yes  Side Effects: no  Regimen Adjustments:   Lithium  600 mg po BID for mood/depression  Effexor XR increased to 112.5 mg po q day for depression/anxiety  Synthroid 25 mcg po q day for adjunctive depression (off-label)  Vistaril 100 mg po q HS for insomnia     Nicotine patch daily for nicotine cessation; patient counseled          DIAGNOSTIC TESTING  Labs reviewed; follow up pending labs; check Li level on Tuesday " with TSH     Disposition:  -SW to assist with aftercare planning and collateral  -Once stable discharge home with outpatient follow up care and/or rehab  -Continue inpatient treatment under a PEC and/or CEC for danger to self and grave disability as evident by depression with SI, diminished ADLs/self-care, and severe psycho-social stressors    NEED FOR CONTINUED HOSPITALIZATION  Psychiatric illness continues to pose a potential threat to life or bodily function, of self or others, thereby requiring the need for continued inpatient psychiatric hospitalization: Yes    Protective inpatient pyschiatric hospitalization required while a safe disposition plan is enacted: Yes    Patient stabilized and ready for discharge from inpatient psychiatric unit: No      STAFF:   Vini Joyner MD  Psychiatry

## 2017-06-25 NOTE — PLAN OF CARE
Problem: Patient Care Overview (Adult)  Goal: Plan of Care Review  Outcome: Ongoing (interventions implemented as appropriate)  Pt is sleeping at this time and has slept 4 hours with one 2-hour awakening.  NAD.  Resp even & unlabored.  Pt did come to the nurses station to request more hydroxyzine pamoate 50 mg capsule to aid sleep but pt only has it ordered prn one time at night and pt had gotten the med previously.  Pt stated that he would talk to the psychiatrist about possible getting a different medication for c/o insomnia.  Pathways clear and bed is low.  Q 15 minute safety checks ongoing.  All precautions maintained.

## 2017-06-25 NOTE — PLAN OF CARE
Problem: Patient Care Overview (Adult)  Goal: Plan of Care Review  Outcome: Ongoing (interventions implemented as appropriate)  Visible in the milieu.  Spent time in the dayroom watching TV with peers.  Unkempt, malodorous.  Withdrawn, calm, cooperative, quiet.  Pt went to assigned room early this evening due to peer being loud, cursing, demanding and disruptive to the milieu.  Pt asked for and received hydroxyzine pamoate 50 mg po for c/o anxiety before going to room.  NAD.  No complaints.  Does not initiate any conversation but will speak when spoken to.  Med compliant.  Safety checks ongoing.

## 2017-06-26 PROCEDURE — 99233 SBSQ HOSP IP/OBS HIGH 50: CPT | Mod: ,,, | Performed by: PSYCHIATRY & NEUROLOGY

## 2017-06-26 PROCEDURE — 27000339 *HC DAILY SUPPLY KIT

## 2017-06-26 PROCEDURE — 25000003 PHARM REV CODE 250: Performed by: PSYCHIATRY & NEUROLOGY

## 2017-06-26 PROCEDURE — 90833 PSYTX W PT W E/M 30 MIN: CPT | Mod: ,,, | Performed by: PSYCHIATRY & NEUROLOGY

## 2017-06-26 PROCEDURE — 11400000 HC PSYCH PRIVATE ROOM

## 2017-06-26 RX ADMIN — VENLAFAXINE HYDROCHLORIDE 112.5 MG: 75 CAPSULE, EXTENDED RELEASE ORAL at 08:06

## 2017-06-26 RX ADMIN — NICOTINE 1 PATCH: 14 PATCH, EXTENDED RELEASE TRANSDERMAL at 04:06

## 2017-06-26 RX ADMIN — HYDROXYZINE PAMOATE 100 MG: 50 CAPSULE ORAL at 08:06

## 2017-06-26 RX ADMIN — THERA TABS 1 TABLET: TAB at 08:06

## 2017-06-26 RX ADMIN — LITHIUM CARBONATE 600 MG: 300 CAPSULE, GELATIN COATED ORAL at 08:06

## 2017-06-26 RX ADMIN — LEVOTHYROXINE SODIUM 25 MCG: 25 TABLET ORAL at 06:06

## 2017-06-26 NOTE — PLAN OF CARE
Problem: Patient Care Overview (Adult)  Goal: Plan of Care Review  Outcome: Ongoing (interventions implemented as appropriate)  Visible in the milieu, spending time in the dayroom with peers watching TV.  Fair interaction with peers.  Calm, quiet and cooperative.  Eating and drinking adequately.  Tolerating meds.  Complained of poor sleep last night and his hydroxyzine pamoate has been increased to 100 mg instead of 50.  Pt will get the 100 mg hydroxyzine tonight.  Denies SI/HI.  Remains sad, depressed.

## 2017-06-26 NOTE — PROGRESS NOTES
PSYCHIATRY DAILY INPATIENT PROGRESS NOTE  SUBSEQUENT HOSPITAL VISIT    ENCOUNTER DATE: 6/26/2017  SITE: [facility] [place]    DATE OF ADMISSION: 6/20/2017 10:04 AM  LENGTH OF STAY: 6 days      HISTORY    CHIEF COMPLAINT   Arnol Dodson is a 27 y.o. male, seen during daily pan rounds on the inpatient unit.  Arnol Dodson presents with the chief complaint of  depression and SI    HPI   (Elements: Location, Quality, Severity, Duration, Timing, Content, Modifying Factors, Associated Signs & Symptoms)    The patient was seen and examined. The chart was reviewed.    Staff reports no behavioral or management issues.     The patient has been compliant with treatment. The patient denied any side effects.    Continued but less Symptoms of Depression: +diminished mood or loss of interest/anhedonia; +irritability, +diminished energy, less change in sleep, less change in appetite, no diminished concentration or cognition or indecisiveness, less PMR (no PMA), +excessive guilt or hopelessness or worthlessness, less suicidal ideations     Continued but less Changes in Sleep: less trouble with initiation/maintenance, no early morning awakening with inability to return to sleep; slept 6 hours last night- more broken; some efficacy with vistaril- patient requests an increased dosage     Continued but less Suicidal/(no)Homicidal ideations: less active/passive ideations, less organized plans, no future intentions; symptoms persist but are less frequent/intense     Denied Symptoms of psychosis: no hallucinations, delusions, disorganized thinking, disorganized behavior or abnormal motor behavior, or negative symptoms      Denied current Symptoms of tatum or hypomania: no elevated, expansive, or irritable mood with no  increased energy or activity; with inflated self-esteem or grandiosity, decreased need for sleep, increased rate of speech, FOI or racing thoughts, distractibility, increased goal directed activity or PMA, or  risky/disinhibited behavior     Continued but less Symptoms of CASANDRA: less excessive anxiety/worry/fear,  with less restlessness, fatigue, poor concentration, irritability, muscle tension, and sleep disturbance     Continued but less Symptoms of PTSD: +h/o trauma; +re-experiencing/intrusive symptoms, +avoidant behavior, +negative alterations in cognition or mood, +hyperarousal symptoms; without dissociative symptoms      PSYCHOTHERAPY ADD-ON +[id number]   30 (16-37*) minutes      Time: 20 minutes  Participants: Met with patient    Therapeutic Intervention Type: insight oriented psychotherapy, behavior modifying psychotherapy, supportive psychotherapy  Why chosen therapy is appropriate versus another modality: relevant to diagnosis, patient responds to this modality, evidence based practice    Target symptoms: depression, anxiety   Primary focus: depression, anxiety, psychosocial stressors  Psychotherapeutic techniques: supportive, behavioral, and problem solving techniques; psycho-education; positive psychology techniques    Outcome monitoring methods: self-report, observation    Patient's response to intervention:  The patient's response to intervention is accepting.    Progress toward goals:  The patient's progress toward goals is fair .        ROS  General ROS: negative  Ophthalmic ROS: negative  ENT ROS: negative  Allergy and Immunology ROS: negative  Hematological and Lymphatic ROS: negative  Endocrine ROS: negative  Respiratory ROS: no cough, shortness of breath, or wheezing  Cardiovascular ROS: no chest pain or dyspnea on exertion  Gastrointestinal ROS: no abdominal pain, change in bowel habits, or black or bloody stools  Genito-Urinary ROS: no dysuria, trouble voiding, or hematuria  Musculoskeletal ROS: negative  Neurological ROS: no TIA or stroke symptoms  Dermatological ROS: negative      PAST MEDICAL HISTORY   Past Medical History:   Diagnosis Date    Asthma     Bipolar disorder     Depression     History of  "psychiatric hospitalization     Hx of psychiatric care     Psychiatric problem     PTSD (post-traumatic stress disorder)     Suicide attempt     Therapy            PSYCHOTROPIC MEDICATIONS   Scheduled Meds:   hydrOXYzine pamoate  100 mg Oral QHS    levothyroxine  25 mcg Oral Before breakfast    lithium  600 mg Oral BID    multivitamin  1 tablet Oral Daily    venlafaxine  112.5 mg Oral Daily     Continuous Infusions:   PRN Meds:.acetaminophen, aluminum-magnesium hydroxide-simethicone, docusate sodium, hydrOXYzine pamoate, loperamide, nicotine, olanzapine **AND** olanzapine        EXAMINATION    VITALS   Vitals:    06/26/17 0800   BP: (!) 140/78   Pulse: (!) 56   Resp: 18   Temp: 97.2 °F (36.2 °C)       CONSTITUTIONAL  General Appearance: WM, heavily tattooed ; NAD     MUSCULOSKELETAL  Muscle Strength and Tone:  normal  Abnormal Involuntary Movements:  none  Gait and Station:  normal; non-ataxic     PSYCHIATRIC   Level of Consciousness: awake, alert  Orientation: p/p/t/s  Grooming: less diminished and inadequate to circumstances; improving  Psychomotor Behavior: no PMA, less PMR  Speech: nl r/t/v/s  Language:  English fluent  Mood: "ok"  Affect: dysphoric, blunted  Thought Process:  linear and organized  Associations:  intact; no JAYCEE  Thought Content:  denied AVH/delusions; denied HI, less SI  Memory:  intact to recent and remote events  Attention:  intact to conversation; not distractible   Fund of Knowledge:  age and education appropriate  Estimate if Intelligence:  average based on work/education history, vocabulary and mental status exam  Insight:  good- seeks help, recognizes illness  Judgment:   good- no bx issues, compliant and cooperative    DIAGNOSTIC TESTING   Laboratory Results  No results found for this or any previous visit (from the past 24 hour(s)).    ASSESSMENT      IMPRESSION   Bipolar Disorder NOS mre depressed, severe, without psychotic features  CASANDRA  PTSD  Nicotine Dependence "      Elevated TSH        MEDICAL DECISION MAKING        PROBLEM LIST AND MANAGEMENT PLANS   Mood  Anxiety  insomnia  Nicotine dependence  Elevated TSH- resolved    PRESCRIPTION DRUG MANAGEMENT  Compliance: yes  Side Effects: no  Regimen Adjustments:   Lithium 600 mg po BID for mood/depression;will adjust according to the Li level  Effexor XR increased to 112.5 mg po q day for depression/anxiety  Synthroid 25 mcg po q day for adjunctive depression (off-label)  Vistaril 100 mg po q HS for insomnia     Nicotine patch daily for nicotine cessation; patient counseled          DIAGNOSTIC TESTING  Labs reviewed; follow up pending labs; check Li level on Tuesday with TSH     Disposition:  -SW to assist with aftercare planning and collateral  -Once stable discharge home with outpatient follow up care and/or rehab  -Continue inpatient treatment under a PEC and/or CEC for danger to self and grave disability as evident by depression with SI, diminished ADLs/self-care, and severe psycho-social stressors    NEED FOR CONTINUED HOSPITALIZATION  Psychiatric illness continues to pose a potential threat to life or bodily function, of self or others, thereby requiring the need for continued inpatient psychiatric hospitalization: Yes    Protective inpatient pyschiatric hospitalization required while a safe disposition plan is enacted: Yes    Patient stabilized and ready for discharge from inpatient psychiatric unit: No      STAFF:   Vini Joyner MD  Psychiatry

## 2017-06-26 NOTE — PLAN OF CARE
Problem: Patient Care Overview (Adult)  Goal: Plan of Care Review  Outcome: Ongoing (interventions implemented as appropriate)  Pt is sleeping at this time and has slept 6 hours with 2 interruptions.  NAD.  Resp even & unlabored.  Pathways clear and bed is low.  Q 15 minute safety checks ongoing.  All precautions maintained.

## 2017-06-26 NOTE — PSYCH
"The patient attended and participated in life skills group. He was attentive and cooperative.  Hand out was used to assist patient in giving a motto to live by. The patient was able to share a motto to live by, "Never a mistake always a lesson." the patient applied this statement to mean that we should veiw mistakes as lessons.   "

## 2017-06-26 NOTE — PLAN OF CARE
Problem: Patient Care Overview (Adult)  Goal: Plan of Care Review  Outcome: Ongoing (interventions implemented as appropriate)  Patient calm and cooperative. Initiates conversation with staff and peers. Mood improved. Denies current SI. Blunt affect. Appetite excellent. Medication compliant. Safety precautions maintained.

## 2017-06-27 PROBLEM — R45.851 DEPRESSION WITH SUICIDAL IDEATION: Status: RESOLVED | Noted: 2017-06-20 | Resolved: 2017-06-27

## 2017-06-27 PROBLEM — F32.A DEPRESSION WITH SUICIDAL IDEATION: Status: RESOLVED | Noted: 2017-06-20 | Resolved: 2017-06-27

## 2017-06-27 LAB
LITHIUM SERPL-SCNC: 0.6 MMOL/L
TSH SERPL DL<=0.005 MIU/L-ACNC: 3.23 UIU/ML

## 2017-06-27 PROCEDURE — 11400000 HC PSYCH PRIVATE ROOM

## 2017-06-27 PROCEDURE — 99233 SBSQ HOSP IP/OBS HIGH 50: CPT | Mod: ,,, | Performed by: PSYCHIATRY & NEUROLOGY

## 2017-06-27 PROCEDURE — 36415 COLL VENOUS BLD VENIPUNCTURE: CPT

## 2017-06-27 PROCEDURE — 25000003 PHARM REV CODE 250: Performed by: PSYCHIATRY & NEUROLOGY

## 2017-06-27 PROCEDURE — 84443 ASSAY THYROID STIM HORMONE: CPT

## 2017-06-27 PROCEDURE — 80178 ASSAY OF LITHIUM: CPT

## 2017-06-27 PROCEDURE — 27000339 *HC DAILY SUPPLY KIT

## 2017-06-27 RX ORDER — LEVOTHYROXINE SODIUM 50 UG/1
50 TABLET ORAL
Status: DISCONTINUED | OUTPATIENT
Start: 2017-06-28 | End: 2017-06-28 | Stop reason: HOSPADM

## 2017-06-27 RX ORDER — LITHIUM CARBONATE 600 MG/1
600 CAPSULE ORAL 2 TIMES DAILY
Qty: 60 CAPSULE | Refills: 1 | Status: SHIPPED | OUTPATIENT
Start: 2017-06-27 | End: 2018-06-27

## 2017-06-27 RX ORDER — LEVOTHYROXINE SODIUM 50 UG/1
50 TABLET ORAL
Qty: 30 TABLET | Refills: 1 | Status: SHIPPED | OUTPATIENT
Start: 2017-06-28 | End: 2018-06-28

## 2017-06-27 RX ORDER — VENLAFAXINE HYDROCHLORIDE 150 MG/1
150 CAPSULE, EXTENDED RELEASE ORAL DAILY
Status: DISCONTINUED | OUTPATIENT
Start: 2017-06-28 | End: 2017-06-28 | Stop reason: HOSPADM

## 2017-06-27 RX ORDER — IBUPROFEN 200 MG
1 TABLET ORAL DAILY PRN
Refills: 0 | COMMUNITY
Start: 2017-06-27

## 2017-06-27 RX ORDER — VENLAFAXINE HYDROCHLORIDE 150 MG/1
150 CAPSULE, EXTENDED RELEASE ORAL DAILY
Qty: 30 CAPSULE | Refills: 1 | Status: SHIPPED | OUTPATIENT
Start: 2017-06-28 | End: 2018-06-28

## 2017-06-27 RX ADMIN — HYDROXYZINE PAMOATE 100 MG: 50 CAPSULE ORAL at 08:06

## 2017-06-27 RX ADMIN — LITHIUM CARBONATE 600 MG: 300 CAPSULE, GELATIN COATED ORAL at 08:06

## 2017-06-27 RX ADMIN — THERA TABS 1 TABLET: TAB at 08:06

## 2017-06-27 RX ADMIN — HYDROXYZINE PAMOATE 50 MG: 50 CAPSULE ORAL at 08:06

## 2017-06-27 RX ADMIN — NICOTINE 1 PATCH: 14 PATCH, EXTENDED RELEASE TRANSDERMAL at 04:06

## 2017-06-27 RX ADMIN — LEVOTHYROXINE SODIUM 25 MCG: 25 TABLET ORAL at 05:06

## 2017-06-27 RX ADMIN — VENLAFAXINE HYDROCHLORIDE 112.5 MG: 75 CAPSULE, EXTENDED RELEASE ORAL at 08:06

## 2017-06-27 NOTE — PROGRESS NOTES
PSYCHIATRY DAILY INPATIENT PROGRESS NOTE  SUBSEQUENT HOSPITAL VISIT    ENCOUNTER DATE: 6/27/2017  SITE: [facility]    DATE OF ADMISSION: 6/20/2017 10:04 AM  LENGTH OF STAY: 7 days      HISTORY    CHIEF COMPLAINT   Arnol Dodson is a 27 y.o. male, seen during daily pan rounds on the inpatient unit.  Arnol Dodson presents with the chief complaint of  depression and SI    HPI   (Elements: Location, Quality, Severity, Duration, Timing, Content, Modifying Factors, Associated Signs & Symptoms)    The patient was seen and examined. The chart was reviewed.    Staff reports no behavioral or management issues.     The patient has been compliant with treatment. The patient denied any side effects.    Improving Symptoms of Depression: less diminished mood or loss of interest/anhedonia; less irritability, less diminished energy, less change in sleep, less change in appetite, no diminished concentration or cognition or indecisiveness, less PMR (no PMA), less excessive guilt or hopelessness or worthlessness, less suicidal ideations     Improved Changes in Sleep: no trouble with initiation/maintenance, no early morning awakening with inability to return to sleep; slept 8.5 hours last night- more broken; some efficacy with vistaril- patient requests an increased dosage     Improving Suicidal/(no)Homicidal ideations: less active/passive ideations, no organized plans, no future intentions; patient is more future oriented, discussing work responsibilities and goals     Denied Symptoms of psychosis: no hallucinations, delusions, disorganized thinking, disorganized behavior or abnormal motor behavior, or negative symptoms      Denied current Symptoms of tatum or hypomania: no elevated, expansive, or irritable mood with no  increased energy or activity; with inflated self-esteem or grandiosity, decreased need for sleep, increased rate of speech, FOI or racing thoughts, distractibility, increased goal directed activity or PMA,  or risky/disinhibited behavior     Improving Symptoms of CASANDRA: less excessive anxiety/worry/fear,  with less restlessness, fatigue, poor concentration, irritability, muscle tension, and sleep disturbance     Improving Symptoms of PTSD: +h/o trauma; +re-experiencing/intrusive symptoms, +avoidant behavior, +negative alterations in cognition or mood, +hyperarousal symptoms; without dissociative symptoms        ROS  General ROS: negative  Ophthalmic ROS: negative  ENT ROS: negative  Allergy and Immunology ROS: negative  Hematological and Lymphatic ROS: negative  Endocrine ROS: negative  Respiratory ROS: no cough, shortness of breath, or wheezing  Cardiovascular ROS: no chest pain or dyspnea on exertion  Gastrointestinal ROS: no abdominal pain, change in bowel habits, or black or bloody stools  Genito-Urinary ROS: no dysuria, trouble voiding, or hematuria  Musculoskeletal ROS: negative  Neurological ROS: no TIA or stroke symptoms  Dermatological ROS: negative      PAST MEDICAL HISTORY   Past Medical History:   Diagnosis Date    Asthma     Bipolar disorder     Depression     History of psychiatric hospitalization     Hx of psychiatric care     Psychiatric problem     PTSD (post-traumatic stress disorder)     Suicide attempt     Therapy            PSYCHOTROPIC MEDICATIONS   Scheduled Meds:   hydrOXYzine pamoate  100 mg Oral QHS    levothyroxine  25 mcg Oral Before breakfast    lithium  600 mg Oral BID    multivitamin  1 tablet Oral Daily    venlafaxine  112.5 mg Oral Daily     Continuous Infusions:   PRN Meds:.acetaminophen, aluminum-magnesium hydroxide-simethicone, docusate sodium, hydrOXYzine pamoate, loperamide, nicotine, olanzapine **AND** olanzapine        EXAMINATION    VITALS   Vitals:    06/27/17 0758   BP: (!) 104/55   Pulse: (!) 58   Resp: 18   Temp: 96.5 °F (35.8 °C)       CONSTITUTIONAL  General Appearance: WM, heavily tattooed ; NAD     MUSCULOSKELETAL  Muscle Strength and Tone:  normal  Abnormal  "Involuntary Movements:  none  Gait and Station:  normal; non-ataxic     PSYCHIATRIC   Level of Consciousness: awake, alert  Orientation: p/p/t/s  Grooming: less diminished and inadequate to circumstances; improving  Psychomotor Behavior: no PMA, less PMR  Speech: nl r/t/v/s  Language:  English fluent  Mood: "ok"  Affect: less dysphoric, less blunted; improving  Thought Process: linear and organized  Associations: intact; no JAYCEE  Thought Content:  denied AVH/delusions; denied HI, less SI  Memory:  intact to recent and remote events  Attention:  intact to conversation; not distractible   Fund of Knowledge:  age and education appropriate  Estimate if Intelligence:  average based on work/education history, vocabulary and mental status exam  Insight:  good- seeks help, recognizes illness  Judgment:   good- no bx issues, compliant and cooperative      DIAGNOSTIC TESTING   Laboratory Results  Recent Results (from the past 24 hour(s))   TSH    Collection Time: 06/27/17  5:47 AM   Result Value Ref Range    TSH 3.234 0.400 - 4.000 uIU/mL       ASSESSMENT      IMPRESSION   Bipolar Disorder NOS mre depressed, severe, without psychotic features  CASANDRA  PTSD  Nicotine Dependence      Elevated TSH        MEDICAL DECISION MAKING        PROBLEM LIST AND MANAGEMENT PLANS   Mood  Anxiety  insomnia  Nicotine dependence  Elevated TSH- resolved    PRESCRIPTION DRUG MANAGEMENT  Compliance: yes  Side Effects: no  Regimen Adjustments:   Lithium 600 mg po BID for mood/depression; will adjust according to the Li level  Effexor XR increased to 150 mg po q day for depression/anxiety  Synthroid to 50 mcg po q day for adjunctive depression (off-label)  Vistaril 100 mg po q HS for insomnia     Nicotine patch daily for nicotine cessation; patient counseled          DIAGNOSTIC TESTING  Labs reviewed; follow up pending labs; f/u Li level      Disposition:  -SW to assist with aftercare planning and collateral  -Once stable discharge home with outpatient " follow up care and/or rehab  -Continue inpatient treatment under a PEC and/or CEC for danger to self and grave disability as evident by depression with SI, diminished ADLs/self-care, and severe psycho-social stressors    NEED FOR CONTINUED HOSPITALIZATION  Psychiatric illness continues to pose a potential threat to life or bodily function, of self or others, thereby requiring the need for continued inpatient psychiatric hospitalization: Yes    Protective inpatient pyschiatric hospitalization required while a safe disposition plan is enacted: Yes    Patient stabilized and ready for discharge from inpatient psychiatric unit: No      STAFF:   Vini Joyner MD  Psychiatry

## 2017-06-27 NOTE — PLAN OF CARE
Problem: Patient Care Overview (Adult)  Goal: Plan of Care Review  Outcome: Ongoing (interventions implemented as appropriate)  Pt has slept 7.5 hours with one interruption and is sleeping at this time.  NAD.  Resp even & unlabored.  Pathways clear and bed is low.  Q 15 minute safety checks ongoing.  All precautions maintained.

## 2017-06-27 NOTE — PLAN OF CARE
Problem: Patient Care Overview (Adult)  Goal: Plan of Care Review  Outcome: Ongoing (interventions implemented as appropriate)  Visible in the milieu spending his time in the dayroom watching TV with peers.  Calm, quiet, cooperative.  Depressed, sad.  Denies SI/HI.  Improved interaction with peers.  Accepting meals and fluids.  Compliant with meds.  Pt stated that he slept a little better last night with the increased hydroxyzine pamoate.  Continue with plan of care.  Encourage pt to verbalize feelings, concerns.  Safety checks ongoing.

## 2017-06-27 NOTE — PLAN OF CARE
Problem: Patient Care Overview (Adult)  Goal: Plan of Care Review  Outcome: Ongoing (interventions implemented as appropriate)  Patient calm and cooperative. Medication compliant. Appetite excellent. Mood pleasant. Denies SI. Focused on discharge.

## 2017-06-28 VITALS
HEART RATE: 61 BPM | HEIGHT: 72 IN | TEMPERATURE: 96 F | RESPIRATION RATE: 20 BRPM | DIASTOLIC BLOOD PRESSURE: 94 MMHG | WEIGHT: 229 LBS | SYSTOLIC BLOOD PRESSURE: 138 MMHG | BODY MASS INDEX: 31.02 KG/M2

## 2017-06-28 PROCEDURE — 25000003 PHARM REV CODE 250: Performed by: PSYCHIATRY & NEUROLOGY

## 2017-06-28 PROCEDURE — 99239 HOSP IP/OBS DSCHRG MGMT >30: CPT | Mod: ,,, | Performed by: PSYCHIATRY & NEUROLOGY

## 2017-06-28 RX ADMIN — VENLAFAXINE HYDROCHLORIDE 150 MG: 150 CAPSULE, EXTENDED RELEASE ORAL at 08:06

## 2017-06-28 RX ADMIN — LITHIUM CARBONATE 600 MG: 300 CAPSULE, GELATIN COATED ORAL at 08:06

## 2017-06-28 RX ADMIN — THERA TABS 1 TABLET: TAB at 08:06

## 2017-06-28 RX ADMIN — LEVOTHYROXINE SODIUM 50 MCG: 50 TABLET ORAL at 06:06

## 2017-06-28 NOTE — PSYCH
Order for discharge received.Discharge instructions explained to pt and voiced an understanding.Calm,cooperative,no si/hi,or psychosis.All discharge medications were reviewed with pt at time of discharge.Discharge medications list were forwarded to the next level of care at time of discharge.Pt discharged home with a friend in stable condition.
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
## 2017-06-28 NOTE — PLAN OF CARE
Problem: Patient Care Overview (Adult)  Goal: Plan of Care Review  Outcome: Ongoing (interventions implemented as appropriate)  Out on the unit in the dayroom interacting with peers and staff and watching TV.  Voices concern over poor sleep and already talking about taking the increase dose of vistaril that is ordered.  Instructed about med and how to take this med and developing a schedule sleep time and sticking to it.  Verbalized understanding.  Plan of care reviewed.  Denies intent to harm self or others at this time.  Accepts all meals and medications.  Gait steady, no falls.  Pleasant, interacts with staff and peers. Promoted an individualized safety plan, reality-based interactions, effective coping strategies, and impulse control.  Will continue to monitor for safety.         Problem: Social/Occupational/Functional Impairment (Adult)  Goal: Improved Social/Occupational/Functional Skills  Outcome: Ongoing (interventions implemented as appropriate)  Out on unit and interacting with peers and staff.  Mostly concern about not sleeping well tonight and wanting to take meds as soon as possible.

## 2017-06-28 NOTE — PSYCH
Patient will be following up with [facility] at [address] [place] [address] in [place], [phone].  Appointment is on 7/13/2017 at 8:30 am.  Patient will  Receive a  tobacco cessation therapy appointment at mentioned appointment.  AVS and discharge summary faxed on 6/28/2017 at 2:16 pm.

## 2022-05-28 NOTE — PLAN OF CARE
Problem: Patient Care Overview (Adult)  Goal: Plan of Care Review  Outcome: Ongoing (interventions implemented as appropriate)  Plan of care reviewed.  Denies intent to harm self or others at this time.  Accepts all meals and medications.  Gait steady, no falls.  Pleasant, interacts with staff and peers. Did state that he feels like he's getting a little cabin fever being cooped up in here and getting a little homesick.  Stated that he has a book to read to help cope.  Promoted an individualized safety plan, reality-based interactions, effective coping strategies, and impulse control.  Will continue to monitor for safety.         Problem: Social/Occupational/Functional Impairment (Adult)  Goal: Improved Social/Occupational/Functional Skills  Outcome: Ongoing (interventions implemented as appropriate)  Out in the dayroom is quiet but does interact occasionally with peers and staff.       [facility]

## 2024-08-08 NOTE — NURSING
Pt's friend Emile was called per pt request notifying him that pt is at our hospital and doing well.  Id number and phone times and number given.  Pt had signed consent to release information.  Emile says he is patients best friend and is concerned about him.  Will call pt tomorrow during phone times.  Emile was very pleasant and thankful for the call.   No